# Patient Record
Sex: MALE | Race: WHITE | NOT HISPANIC OR LATINO | Employment: UNEMPLOYED | URBAN - NONMETROPOLITAN AREA
[De-identification: names, ages, dates, MRNs, and addresses within clinical notes are randomized per-mention and may not be internally consistent; named-entity substitution may affect disease eponyms.]

---

## 2017-04-26 ENCOUNTER — HOSPITAL ENCOUNTER (EMERGENCY)
Facility: HOSPITAL | Age: 37
Discharge: STILL ADMITTED OR ADMINISTRATIVE | End: 2017-04-26
Attending: EMERGENCY MEDICINE

## 2017-04-26 ENCOUNTER — HOSPITAL ENCOUNTER (INPATIENT)
Facility: HOSPITAL | Age: 37
LOS: 5 days | Discharge: HOME | End: 2017-05-01

## 2017-04-26 VITALS
WEIGHT: 125 LBS | BODY MASS INDEX: 17.9 KG/M2 | DIASTOLIC BLOOD PRESSURE: 79 MMHG | TEMPERATURE: 98.1 F | RESPIRATION RATE: 16 BRPM | OXYGEN SATURATION: 100 % | SYSTOLIC BLOOD PRESSURE: 123 MMHG | HEART RATE: 76 BPM | HEIGHT: 70 IN

## 2017-04-26 DIAGNOSIS — F19.10 SUBSTANCE ABUSE (HCC): Primary | ICD-10-CM

## 2017-04-26 PROBLEM — R45.851 SUICIDAL IDEATION: Status: ACTIVE | Noted: 2017-04-26

## 2017-04-26 LAB
6-ACETYL MORPHINE: NEGATIVE
ALBUMIN SERPL-MCNC: 4.5 G/DL (ref 3.5–5)
ALBUMIN/GLOB SERPL: 1.1 G/DL (ref 1.5–2.5)
ALP SERPL-CCNC: 101 U/L (ref 40–129)
ALT SERPL W P-5'-P-CCNC: 12 U/L (ref 10–44)
AMPHET+METHAMPHET UR QL: POSITIVE
ANION GAP SERPL CALCULATED.3IONS-SCNC: 6.2 MMOL/L (ref 3.6–11.2)
AST SERPL-CCNC: 14 U/L (ref 10–34)
BACTERIA UR QL AUTO: ABNORMAL /HPF
BARBITURATES UR QL SCN: NEGATIVE
BASOPHILS # BLD AUTO: 0.03 10*3/MM3 (ref 0–0.3)
BASOPHILS NFR BLD AUTO: 0.3 % (ref 0–2)
BENZODIAZ UR QL SCN: POSITIVE
BILIRUB SERPL-MCNC: 0.4 MG/DL (ref 0.2–1.8)
BILIRUB UR QL STRIP: NEGATIVE
BUN BLD-MCNC: 15 MG/DL (ref 7–21)
BUN/CREAT SERPL: 14.2 (ref 7–25)
BUPRENORPHINE SERPL-MCNC: POSITIVE NG/ML
CALCIUM SPEC-SCNC: 9.8 MG/DL (ref 7.7–10)
CANNABINOIDS SERPL QL: POSITIVE
CHLORIDE SERPL-SCNC: 104 MMOL/L (ref 99–112)
CLARITY UR: CLEAR
CO2 SERPL-SCNC: 32.8 MMOL/L (ref 24.3–31.9)
COCAINE UR QL: NEGATIVE
COLOR UR: YELLOW
CREAT BLD-MCNC: 1.06 MG/DL (ref 0.43–1.29)
DEPRECATED RDW RBC AUTO: 44.8 FL (ref 37–54)
EOSINOPHIL # BLD AUTO: 0.1 10*3/MM3 (ref 0–0.7)
EOSINOPHIL NFR BLD AUTO: 1.1 % (ref 0–5)
ERYTHROCYTE [DISTWIDTH] IN BLOOD BY AUTOMATED COUNT: 14 % (ref 11.5–14.5)
ETHANOL BLD-MCNC: <10 MG/DL
ETHANOL UR QL: <0.01 %
GFR SERPL CREATININE-BSD FRML MDRD: 80 ML/MIN/1.73
GLOBULIN UR ELPH-MCNC: 4 GM/DL
GLUCOSE BLD-MCNC: 86 MG/DL (ref 70–110)
GLUCOSE UR STRIP-MCNC: NEGATIVE MG/DL
HCT VFR BLD AUTO: 42.1 % (ref 42–52)
HGB BLD-MCNC: 13.2 G/DL (ref 14–18)
HGB UR QL STRIP.AUTO: NEGATIVE
HYALINE CASTS UR QL AUTO: ABNORMAL /LPF
IMM GRANULOCYTES # BLD: 0.01 10*3/MM3 (ref 0–0.03)
IMM GRANULOCYTES NFR BLD: 0.1 % (ref 0–0.5)
KETONES UR QL STRIP: NEGATIVE
LEUKOCYTE ESTERASE UR QL STRIP.AUTO: ABNORMAL
LYMPHOCYTES # BLD AUTO: 2.03 10*3/MM3 (ref 1–3)
LYMPHOCYTES NFR BLD AUTO: 23.2 % (ref 21–51)
MAGNESIUM SERPL-MCNC: 2.3 MG/DL (ref 1.7–2.6)
MCH RBC QN AUTO: 28 PG (ref 27–33)
MCHC RBC AUTO-ENTMCNC: 31.4 G/DL (ref 33–37)
MCV RBC AUTO: 89.2 FL (ref 80–94)
MDMA UR QL SCN: POSITIVE
METHADONE UR QL SCN: NEGATIVE
MONOCYTES # BLD AUTO: 0.91 10*3/MM3 (ref 0.1–0.9)
MONOCYTES NFR BLD AUTO: 10.4 % (ref 0–10)
NEUTROPHILS # BLD AUTO: 5.68 10*3/MM3 (ref 1.4–6.5)
NEUTROPHILS NFR BLD AUTO: 64.9 % (ref 30–70)
NITRITE UR QL STRIP: NEGATIVE
OPIATES UR QL: NEGATIVE
OSMOLALITY SERPL CALC.SUM OF ELEC: 285.1 MOSM/KG (ref 273–305)
OXYCODONE UR QL SCN: NEGATIVE
PCP UR QL SCN: NEGATIVE
PH UR STRIP.AUTO: 6.5 [PH] (ref 5–8)
PLATELET # BLD AUTO: 337 10*3/MM3 (ref 130–400)
PMV BLD AUTO: 8.5 FL (ref 6–10)
POTASSIUM BLD-SCNC: 4.2 MMOL/L (ref 3.5–5.3)
PROT SERPL-MCNC: 8.5 G/DL (ref 6–8)
PROT UR QL STRIP: NEGATIVE
RBC # BLD AUTO: 4.72 10*6/MM3 (ref 4.7–6.1)
RBC # UR: ABNORMAL /HPF
REF LAB TEST METHOD: ABNORMAL
SODIUM BLD-SCNC: 143 MMOL/L (ref 135–153)
SP GR UR STRIP: 1.02 (ref 1–1.03)
SQUAMOUS #/AREA URNS HPF: ABNORMAL /HPF
UROBILINOGEN UR QL STRIP: ABNORMAL
WBC NRBC COR # BLD: 8.76 10*3/MM3 (ref 4.5–12.5)
WBC UR QL AUTO: ABNORMAL /HPF

## 2017-04-26 RX ORDER — BENZTROPINE MESYLATE 1 MG/ML
0.5 INJECTION INTRAMUSCULAR; INTRAVENOUS DAILY PRN
Status: DISCONTINUED | OUTPATIENT
Start: 2017-04-26 | End: 2017-05-01 | Stop reason: HOSPADM

## 2017-04-26 RX ORDER — HYDROXYZINE 50 MG/1
50 TABLET, FILM COATED ORAL EVERY 6 HOURS PRN
Status: DISCONTINUED | OUTPATIENT
Start: 2017-04-26 | End: 2017-05-01 | Stop reason: HOSPADM

## 2017-04-26 RX ORDER — LOPERAMIDE HYDROCHLORIDE 2 MG/1
2 CAPSULE ORAL 4 TIMES DAILY PRN
Status: DISCONTINUED | OUTPATIENT
Start: 2017-04-26 | End: 2017-05-01 | Stop reason: HOSPADM

## 2017-04-26 RX ORDER — NICOTINE 21 MG/24HR
1 PATCH, TRANSDERMAL 24 HOURS TRANSDERMAL DAILY
Status: DISCONTINUED | OUTPATIENT
Start: 2017-04-26 | End: 2017-05-01 | Stop reason: HOSPADM

## 2017-04-26 RX ORDER — TRAZODONE HYDROCHLORIDE 50 MG/1
50 TABLET ORAL NIGHTLY PRN
Status: DISCONTINUED | OUTPATIENT
Start: 2017-04-26 | End: 2017-05-01 | Stop reason: HOSPADM

## 2017-04-26 RX ORDER — ALUMINA, MAGNESIA, AND SIMETHICONE 2400; 2400; 240 MG/30ML; MG/30ML; MG/30ML
15 SUSPENSION ORAL EVERY 6 HOURS PRN
Status: DISCONTINUED | OUTPATIENT
Start: 2017-04-26 | End: 2017-05-01 | Stop reason: HOSPADM

## 2017-04-26 RX ORDER — ECHINACEA PURPUREA EXTRACT 125 MG
2 TABLET ORAL AS NEEDED
Status: DISCONTINUED | OUTPATIENT
Start: 2017-04-26 | End: 2017-05-01 | Stop reason: HOSPADM

## 2017-04-26 RX ORDER — IBUPROFEN 400 MG/1
600 TABLET ORAL EVERY 6 HOURS PRN
Status: DISCONTINUED | OUTPATIENT
Start: 2017-04-26 | End: 2017-05-01 | Stop reason: HOSPADM

## 2017-04-26 RX ORDER — ONDANSETRON 4 MG/1
4 TABLET, FILM COATED ORAL EVERY 6 HOURS PRN
Status: DISCONTINUED | OUTPATIENT
Start: 2017-04-26 | End: 2017-05-01 | Stop reason: HOSPADM

## 2017-04-26 RX ORDER — BENZTROPINE MESYLATE 1 MG/1
1 TABLET ORAL DAILY PRN
Status: DISCONTINUED | OUTPATIENT
Start: 2017-04-26 | End: 2017-05-01 | Stop reason: HOSPADM

## 2017-04-26 RX ORDER — BENZONATATE 100 MG/1
100 CAPSULE ORAL 3 TIMES DAILY PRN
Status: DISCONTINUED | OUTPATIENT
Start: 2017-04-26 | End: 2017-05-01 | Stop reason: HOSPADM

## 2017-04-26 RX ORDER — FAMOTIDINE 20 MG/1
20 TABLET, FILM COATED ORAL 2 TIMES DAILY PRN
Status: DISCONTINUED | OUTPATIENT
Start: 2017-04-26 | End: 2017-05-01 | Stop reason: HOSPADM

## 2017-04-26 RX ADMIN — HYDROXYZINE HYDROCHLORIDE 50 MG: 50 TABLET ORAL at 22:00

## 2017-04-26 RX ADMIN — TRAZODONE HYDROCHLORIDE 50 MG: 50 TABLET ORAL at 21:53

## 2017-04-27 PROBLEM — F19.10 POLYSUBSTANCE ABUSE (HCC): Status: ACTIVE | Noted: 2017-04-27

## 2017-04-27 PROBLEM — F42.9 OCD (OBSESSIVE COMPULSIVE DISORDER): Status: ACTIVE | Noted: 2017-04-27

## 2017-04-27 PROBLEM — F33.9 MAJOR DEPRESSION, RECURRENT (HCC): Status: ACTIVE | Noted: 2017-04-26

## 2017-04-27 LAB — T4 FREE SERPL-MCNC: 1.16 NG/DL (ref 0.89–1.76)

## 2017-04-27 PROCEDURE — 71020 HC CHEST PA AND LATERAL: CPT

## 2017-04-27 PROCEDURE — 84439 ASSAY OF FREE THYROXINE: CPT | Performed by: PSYCHIATRY & NEUROLOGY

## 2017-04-27 PROCEDURE — 99223 1ST HOSP IP/OBS HIGH 75: CPT | Performed by: PSYCHIATRY & NEUROLOGY

## 2017-04-27 RX ORDER — QUETIAPINE FUMARATE 25 MG/1
50 TABLET, FILM COATED ORAL NIGHTLY
Status: DISCONTINUED | OUTPATIENT
Start: 2017-04-27 | End: 2017-04-28

## 2017-04-27 RX ADMIN — NICOTINE 1 PATCH: 21 PATCH TRANSDERMAL at 10:45

## 2017-04-27 RX ADMIN — SERTRALINE HYDROCHLORIDE 50 MG: 50 TABLET, FILM COATED ORAL at 12:31

## 2017-04-27 RX ADMIN — TRAZODONE HYDROCHLORIDE 50 MG: 50 TABLET ORAL at 20:26

## 2017-04-27 RX ADMIN — QUETIAPINE FUMARATE 50 MG: 25 TABLET, FILM COATED ORAL at 20:26

## 2017-04-28 LAB — TSH SERPL DL<=0.05 MIU/L-ACNC: 0.32 MIU/ML (ref 0.55–4.78)

## 2017-04-28 PROCEDURE — 99232 SBSQ HOSP IP/OBS MODERATE 35: CPT | Performed by: PSYCHIATRY & NEUROLOGY

## 2017-04-28 PROCEDURE — 84443 ASSAY THYROID STIM HORMONE: CPT | Performed by: PSYCHIATRY & NEUROLOGY

## 2017-04-28 RX ORDER — QUETIAPINE FUMARATE 100 MG/1
100 TABLET, FILM COATED ORAL NIGHTLY
Status: DISCONTINUED | OUTPATIENT
Start: 2017-04-28 | End: 2017-05-01 | Stop reason: HOSPADM

## 2017-04-28 RX ORDER — FLUOXETINE HYDROCHLORIDE 20 MG/1
20 CAPSULE ORAL DAILY
Status: DISCONTINUED | OUTPATIENT
Start: 2017-04-28 | End: 2017-05-01 | Stop reason: HOSPADM

## 2017-04-28 RX ADMIN — FLUOXETINE 20 MG: 20 CAPSULE ORAL at 10:05

## 2017-04-28 RX ADMIN — TRAZODONE HYDROCHLORIDE 50 MG: 50 TABLET ORAL at 20:28

## 2017-04-28 RX ADMIN — QUETIAPINE FUMARATE 100 MG: 100 TABLET ORAL at 20:28

## 2017-04-28 RX ADMIN — NICOTINE 1 PATCH: 21 PATCH TRANSDERMAL at 08:21

## 2017-04-28 RX ADMIN — HYDROXYZINE HYDROCHLORIDE 50 MG: 50 TABLET ORAL at 18:12

## 2017-04-29 PROBLEM — F33.2 MAJOR DEPRESSIVE DISORDER, RECURRENT, SEVERE WITHOUT PSYCHOTIC FEATURES (HCC): Status: ACTIVE | Noted: 2017-04-26

## 2017-04-29 LAB — BACTERIA SPEC AEROBE CULT: NORMAL

## 2017-04-29 PROCEDURE — 99232 SBSQ HOSP IP/OBS MODERATE 35: CPT

## 2017-04-29 RX ADMIN — NICOTINE 1 PATCH: 21 PATCH TRANSDERMAL at 08:34

## 2017-04-29 RX ADMIN — TRAZODONE HYDROCHLORIDE 50 MG: 50 TABLET ORAL at 22:13

## 2017-04-29 RX ADMIN — FLUOXETINE 20 MG: 20 CAPSULE ORAL at 08:34

## 2017-04-29 RX ADMIN — QUETIAPINE FUMARATE 100 MG: 100 TABLET ORAL at 20:50

## 2017-04-30 PROCEDURE — 99231 SBSQ HOSP IP/OBS SF/LOW 25: CPT

## 2017-04-30 RX ADMIN — QUETIAPINE FUMARATE 100 MG: 100 TABLET ORAL at 20:21

## 2017-04-30 RX ADMIN — NICOTINE 1 PATCH: 21 PATCH TRANSDERMAL at 08:27

## 2017-04-30 RX ADMIN — FLUOXETINE 20 MG: 20 CAPSULE ORAL at 08:26

## 2017-05-01 VITALS
HEART RATE: 73 BPM | RESPIRATION RATE: 16 BRPM | TEMPERATURE: 97.1 F | HEIGHT: 67 IN | SYSTOLIC BLOOD PRESSURE: 123 MMHG | DIASTOLIC BLOOD PRESSURE: 79 MMHG | BODY MASS INDEX: 16.43 KG/M2 | WEIGHT: 104.7 LBS | OXYGEN SATURATION: 99 %

## 2017-05-01 PROCEDURE — 99238 HOSP IP/OBS DSCHRG MGMT 30/<: CPT | Performed by: PSYCHIATRY & NEUROLOGY

## 2017-05-01 RX ORDER — FLUOXETINE HYDROCHLORIDE 20 MG/1
20 CAPSULE ORAL DAILY
Qty: 30 CAPSULE | Refills: 0 | Status: SHIPPED | OUTPATIENT
Start: 2017-05-01 | End: 2017-07-19

## 2017-05-01 RX ORDER — QUETIAPINE FUMARATE 100 MG/1
100 TABLET, FILM COATED ORAL NIGHTLY
Qty: 30 TABLET | Refills: 0 | Status: SHIPPED | OUTPATIENT
Start: 2017-05-01

## 2017-05-01 RX ADMIN — NICOTINE 1 PATCH: 21 PATCH TRANSDERMAL at 08:00

## 2017-05-01 RX ADMIN — FLUOXETINE 20 MG: 20 CAPSULE ORAL at 08:00

## 2017-07-13 ENCOUNTER — HOSPITAL ENCOUNTER (EMERGENCY)
Facility: HOSPITAL | Age: 37
Discharge: HOME | End: 2017-07-13
Attending: EMERGENCY MEDICINE

## 2017-07-13 VITALS
TEMPERATURE: 98.3 F | HEART RATE: 79 BPM | HEIGHT: 70 IN | RESPIRATION RATE: 20 BRPM | WEIGHT: 135 LBS | OXYGEN SATURATION: 99 % | SYSTOLIC BLOOD PRESSURE: 140 MMHG | BODY MASS INDEX: 19.33 KG/M2 | DIASTOLIC BLOOD PRESSURE: 90 MMHG

## 2017-07-13 DIAGNOSIS — F41.9 ANXIETY: Primary | ICD-10-CM

## 2017-07-13 NOTE — NURSING NOTE
Intake assessment completed. Patient denies any current SI or HI. Patient reports that he is currently residing in a recovery place and has been away from his family for about 4 months and that he missed his chance to visit with them earlier today and became very upset and had an anxiety attack. He says that due to his hx of depression the place he was staying at wanted him to be checked out real good and he told them about the [facility] so they brought him here. He also reports that this was also a way for him to get to see his family who met him here at the hospital. He denies having any thoughts of self harm at all and says that he never told anyone this. He says that they just knew about his history and felt he should be evaluated. Anxiety and depression both rated a 0 out of 10 at this time. He reports that he is over 100 days sober now and wants to stay clean. Emotional support provided to patient and pt praised for working on his goals of remaining sober.

## 2017-07-14 ENCOUNTER — HOSPITAL ENCOUNTER (INPATIENT)
Facility: HOSPITAL | Age: 37
LOS: 5 days | Discharge: HOME | End: 2017-07-19
Attending: PSYCHIATRY & NEUROLOGY | Admitting: PSYCHIATRY & NEUROLOGY

## 2017-07-14 ENCOUNTER — HOSPITAL ENCOUNTER (EMERGENCY)
Facility: HOSPITAL | Age: 37
Discharge: STILL ADMITTED OR ADMINISTRATIVE | End: 2017-07-14
Attending: EMERGENCY MEDICINE

## 2017-07-14 VITALS
HEART RATE: 94 BPM | HEIGHT: 70 IN | OXYGEN SATURATION: 99 % | BODY MASS INDEX: 19.33 KG/M2 | WEIGHT: 135 LBS | TEMPERATURE: 98.3 F | SYSTOLIC BLOOD PRESSURE: 143 MMHG | RESPIRATION RATE: 18 BRPM | DIASTOLIC BLOOD PRESSURE: 101 MMHG

## 2017-07-14 DIAGNOSIS — R45.851 SUICIDAL IDEATION: ICD-10-CM

## 2017-07-14 DIAGNOSIS — F19.10 POLYSUBSTANCE ABUSE (HCC): Primary | ICD-10-CM

## 2017-07-14 DIAGNOSIS — F33.2 MAJOR DEPRESSIVE DISORDER, RECURRENT, SEVERE WITHOUT PSYCHOTIC FEATURES (HCC): ICD-10-CM

## 2017-07-14 PROBLEM — F32.9 MDD (MAJOR DEPRESSIVE DISORDER), SINGLE EPISODE: Status: ACTIVE | Noted: 2017-07-14

## 2017-07-14 LAB
6-ACETYL MORPHINE: NEGATIVE
ALBUMIN SERPL-MCNC: 4.9 G/DL (ref 3.5–5)
ALBUMIN/GLOB SERPL: 1.4 G/DL (ref 1.5–2.5)
ALP SERPL-CCNC: 80 U/L (ref 40–129)
ALT SERPL W P-5'-P-CCNC: 54 U/L (ref 10–44)
AMPHET+METHAMPHET UR QL: NEGATIVE
ANION GAP SERPL CALCULATED.3IONS-SCNC: 5.4 MMOL/L (ref 3.6–11.2)
AST SERPL-CCNC: 39 U/L (ref 10–34)
BARBITURATES UR QL SCN: NEGATIVE
BASOPHILS # BLD AUTO: 0.05 10*3/MM3 (ref 0–0.3)
BASOPHILS NFR BLD AUTO: 0.5 % (ref 0–2)
BENZODIAZ UR QL SCN: NEGATIVE
BILIRUB SERPL-MCNC: 0.2 MG/DL (ref 0.2–1.8)
BILIRUB UR QL STRIP: NEGATIVE
BUN BLD-MCNC: 15 MG/DL (ref 7–21)
BUN/CREAT SERPL: 11.7 (ref 7–25)
BUPRENORPHINE SERPL-MCNC: NEGATIVE NG/ML
CALCIUM SPEC-SCNC: 10.1 MG/DL (ref 7.7–10)
CANNABINOIDS SERPL QL: NEGATIVE
CHLORIDE SERPL-SCNC: 112 MMOL/L (ref 99–112)
CLARITY UR: CLEAR
CO2 SERPL-SCNC: 28.6 MMOL/L (ref 24.3–31.9)
COCAINE UR QL: NEGATIVE
COLOR UR: YELLOW
CREAT BLD-MCNC: 1.28 MG/DL (ref 0.43–1.29)
DEPRECATED RDW RBC AUTO: 53 FL (ref 37–54)
EOSINOPHIL # BLD AUTO: 0.07 10*3/MM3 (ref 0–0.7)
EOSINOPHIL NFR BLD AUTO: 0.7 % (ref 0–5)
ERYTHROCYTE [DISTWIDTH] IN BLOOD BY AUTOMATED COUNT: 16.5 % (ref 11.5–14.5)
ETHANOL BLD-MCNC: <10 MG/DL
ETHANOL UR QL: <0.01 %
GFR SERPL CREATININE-BSD FRML MDRD: 64 ML/MIN/1.73
GLOBULIN UR ELPH-MCNC: 3.5 GM/DL
GLUCOSE BLD-MCNC: 95 MG/DL (ref 70–110)
GLUCOSE UR STRIP-MCNC: NEGATIVE MG/DL
HCT VFR BLD AUTO: 46.4 % (ref 42–52)
HGB BLD-MCNC: 14.8 G/DL (ref 14–18)
HGB UR QL STRIP.AUTO: NEGATIVE
IMM GRANULOCYTES # BLD: 0.02 10*3/MM3 (ref 0–0.03)
IMM GRANULOCYTES NFR BLD: 0.2 % (ref 0–0.5)
KETONES UR QL STRIP: ABNORMAL
LEUKOCYTE ESTERASE UR QL STRIP.AUTO: NEGATIVE
LYMPHOCYTES # BLD AUTO: 2.14 10*3/MM3 (ref 1–3)
LYMPHOCYTES NFR BLD AUTO: 20.5 % (ref 21–51)
MCH RBC QN AUTO: 29.4 PG (ref 27–33)
MCHC RBC AUTO-ENTMCNC: 31.9 G/DL (ref 33–37)
MCV RBC AUTO: 92.1 FL (ref 80–94)
METHADONE UR QL SCN: NEGATIVE
MONOCYTES # BLD AUTO: 0.89 10*3/MM3 (ref 0.1–0.9)
MONOCYTES NFR BLD AUTO: 8.5 % (ref 0–10)
NEUTROPHILS # BLD AUTO: 7.29 10*3/MM3 (ref 1.4–6.5)
NEUTROPHILS NFR BLD AUTO: 69.6 % (ref 30–70)
NITRITE UR QL STRIP: NEGATIVE
OPIATES UR QL: NEGATIVE
OSMOLALITY SERPL CALC.SUM OF ELEC: 291.2 MOSM/KG (ref 273–305)
OXYCODONE UR QL SCN: NEGATIVE
PCP UR QL SCN: NEGATIVE
PH UR STRIP.AUTO: 6.5 [PH] (ref 5–8)
PLATELET # BLD AUTO: 260 10*3/MM3 (ref 130–400)
PMV BLD AUTO: 9.8 FL (ref 6–10)
POTASSIUM BLD-SCNC: 4.1 MMOL/L (ref 3.5–5.3)
PROT SERPL-MCNC: 8.4 G/DL (ref 6–8)
PROT UR QL STRIP: ABNORMAL
RBC # BLD AUTO: 5.04 10*6/MM3 (ref 4.7–6.1)
SODIUM BLD-SCNC: 146 MMOL/L (ref 135–153)
SP GR UR STRIP: 1.03 (ref 1–1.03)
UROBILINOGEN UR QL STRIP: ABNORMAL
WBC NRBC COR # BLD: 10.46 10*3/MM3 (ref 4.5–12.5)

## 2017-07-14 RX ORDER — MULTIVITAMIN
1 TABLET ORAL DAILY
COMMUNITY

## 2017-07-14 RX ORDER — HYDROXYZINE 50 MG/1
50 TABLET, FILM COATED ORAL EVERY 6 HOURS PRN
Status: DISCONTINUED | OUTPATIENT
Start: 2017-07-14 | End: 2017-07-19 | Stop reason: HOSPADM

## 2017-07-14 RX ORDER — FAMOTIDINE 20 MG/1
20 TABLET, FILM COATED ORAL 2 TIMES DAILY PRN
Status: DISCONTINUED | OUTPATIENT
Start: 2017-07-14 | End: 2017-07-19 | Stop reason: HOSPADM

## 2017-07-14 RX ORDER — FLUOXETINE HYDROCHLORIDE 20 MG/1
20 CAPSULE ORAL DAILY
Status: DISCONTINUED | OUTPATIENT
Start: 2017-07-15 | End: 2017-07-15

## 2017-07-14 RX ORDER — TRAZODONE HYDROCHLORIDE 50 MG/1
50 TABLET ORAL NIGHTLY PRN
Status: DISCONTINUED | OUTPATIENT
Start: 2017-07-14 | End: 2017-07-19

## 2017-07-14 RX ORDER — BENZONATATE 100 MG/1
100 CAPSULE ORAL 3 TIMES DAILY PRN
Status: DISCONTINUED | OUTPATIENT
Start: 2017-07-14 | End: 2017-07-19 | Stop reason: HOSPADM

## 2017-07-14 RX ORDER — ALUMINA, MAGNESIA, AND SIMETHICONE 2400; 2400; 240 MG/30ML; MG/30ML; MG/30ML
15 SUSPENSION ORAL EVERY 6 HOURS PRN
Status: DISCONTINUED | OUTPATIENT
Start: 2017-07-14 | End: 2017-07-19 | Stop reason: HOSPADM

## 2017-07-14 RX ORDER — QUETIAPINE FUMARATE 100 MG/1
100 TABLET, FILM COATED ORAL NIGHTLY
Status: DISCONTINUED | OUTPATIENT
Start: 2017-07-14 | End: 2017-07-18

## 2017-07-14 RX ORDER — MULTIVITAMIN
1 TABLET ORAL DAILY
Status: DISCONTINUED | OUTPATIENT
Start: 2017-07-15 | End: 2017-07-19 | Stop reason: HOSPADM

## 2017-07-14 RX ORDER — CHOLECALCIFEROL (VITAMIN D3) 125 MCG
500 CAPSULE ORAL DAILY
COMMUNITY

## 2017-07-14 RX ORDER — ECHINACEA PURPUREA EXTRACT 125 MG
2 TABLET ORAL AS NEEDED
Status: DISCONTINUED | OUTPATIENT
Start: 2017-07-14 | End: 2017-07-19 | Stop reason: HOSPADM

## 2017-07-14 RX ORDER — LANOLIN ALCOHOL/MO/W.PET/CERES
500 CREAM (GRAM) TOPICAL DAILY
Status: DISCONTINUED | OUTPATIENT
Start: 2017-07-15 | End: 2017-07-19 | Stop reason: HOSPADM

## 2017-07-14 RX ORDER — LOPERAMIDE HYDROCHLORIDE 2 MG/1
2 CAPSULE ORAL 4 TIMES DAILY PRN
Status: DISCONTINUED | OUTPATIENT
Start: 2017-07-14 | End: 2017-07-19 | Stop reason: HOSPADM

## 2017-07-14 RX ORDER — IBUPROFEN 400 MG/1
600 TABLET ORAL EVERY 6 HOURS PRN
Status: DISCONTINUED | OUTPATIENT
Start: 2017-07-14 | End: 2017-07-19 | Stop reason: HOSPADM

## 2017-07-14 RX ORDER — ONDANSETRON 4 MG/1
4 TABLET, FILM COATED ORAL EVERY 6 HOURS PRN
Status: DISCONTINUED | OUTPATIENT
Start: 2017-07-14 | End: 2017-07-19 | Stop reason: HOSPADM

## 2017-07-14 RX ORDER — NICOTINE 21 MG/24HR
1 PATCH, TRANSDERMAL 24 HOURS TRANSDERMAL
Status: DISCONTINUED | OUTPATIENT
Start: 2017-07-15 | End: 2017-07-17

## 2017-07-14 RX ADMIN — QUETIAPINE FUMARATE 100 MG: 100 TABLET ORAL at 22:34

## 2017-07-14 NOTE — NURSING NOTE
Attempted to contact  via phone x3. Called and spoke to Dr. Gold. Intake information provided. Instructed to dc pt and provide outpt referral.[id number].  Notified pt and ed provider of plan of care.

## 2017-07-14 NOTE — NURSING NOTE
Intake assessment completed. Patient reports that today he had family court and lost custody of his oldest son to his x wife. Patient was also informed by the place where he was staying at for rehab that they were not letting him come back and told him it was due to his anxiety problems and that they felt he was no longer a fit for their facility. Patient reports that he is so overwhelmed today that he is depressed, feels worthless, helpless and earlier started thinking that he wished he had done it right the first time. When asked what he meant, the patient reports that he thought about just overdosing and being done with it. Anxiety and depression rated a 10/10. Patient says that yesterday he felt upbeat and got to see his family and then today his world just fell apart and feels useless. Emotional support provided to pt. Patient denies any current drug use but says that he is afraid he is going to start using again and says he is scared to death. Emotional support provided to pt.

## 2017-07-14 NOTE — NURSING NOTE
Patient to intake per ED staff.  Pockets emptied and through search complete.  Patient searched by intake nurse and witnessed (Security Nickolas) per ED Policy 100.  Belongings logged on Personal Belongings Inventory Sheet.  Patient placed in hospital attire.  Room swept for any potential safety hazards, room cleared, and patient placed in treatment room.  Patient ready for evaluation.

## 2017-07-14 NOTE — ED PROVIDER NOTES
Subjective   Patient is a 36 y.o. male presenting with mental health disorder.   History provided by:  Patient   used: No    Mental Health Problem   Presenting symptoms comment:  Patient presents to the ED after anxiety attack.  He denies SI/HI,AVH.  Patient is currently residing at rehab facility in [place], [place] ([facility]).  Patient stated that they just recommended he have a psych eval.  Timing:  Constant  Progression:  Worsening  Chronicity:  New  Relieved by:  Nothing  Worsened by:  Nothing  Ineffective treatments:  None tried  Associated symptoms: anxiety    Associated symptoms: no abdominal pain, no anhedonia, no appetite change, no chest pain, no decreased need for sleep, not distractible, no euphoric mood, no fatigue, no feelings of worthlessness, no headaches, no hypersomnia, no hyperventilation, no insomnia, no irritability, no poor judgment, no school problems and no weight change    Risk factors: hx of mental illness        Review of Systems   Constitutional: Negative.  Negative for appetite change, fatigue and irritability.   HENT: Negative.    Eyes: Negative.    Respiratory: Negative.    Cardiovascular: Negative.  Negative for chest pain.   Gastrointestinal: Negative.  Negative for abdominal pain.   Endocrine: Negative.    Genitourinary: Negative.    Musculoskeletal: Negative.    Skin: Negative.    Allergic/Immunologic: Negative.    Neurological: Negative.  Negative for headaches.   Hematological: Negative.    Psychiatric/Behavioral: The patient is nervous/anxious. The patient does not have insomnia.    All other systems reviewed and are negative.      Past Medical History:   Diagnosis Date   • ADHD (attention deficit hyperactivity disorder)    • Anxiety    • Depression    • Kidney stones    • Psychiatric illness    • Substance abuse    • Suicidal thoughts    • Suicide attempt     overdose attempt 4/20/17 and 4/22/17       No Known Allergies    Past Surgical History:   Procedure  "Laterality Date   • NO PAST SURGERIES         Family History   Problem Relation Age of Onset   • Anxiety disorder Mother    • Depression Father    • Anxiety disorder Father    • Drug abuse Cousin        Social History     Social History   • Marital status:      Spouse name: N/A   • Number of children: N/A   • Years of education: N/A     Social History Main Topics   • Smoking status: Current Every Day Smoker     Packs/day: 0.50     Years: 13.00     Types: Cigarettes   • Smokeless tobacco: Never Used   • Alcohol use No   • Drug use: Yes     Special: Marijuana, Amphetamines      Comment: States \"Up until last week, the extent of my drug use was marijuana. I couldn't get anybody to give me anything to help with my anxiety. Then I took everything I could get my hands on so I wouldn't wake up.\"   • Sexual activity: Yes     Partners: Female     Other Topics Concern   • None     Social History Narrative           Objective   Physical Exam   Constitutional: He is oriented to person, place, and time. He appears well-developed and well-nourished.   HENT:   Head: Normocephalic and atraumatic.   Right Ear: External ear normal.   Left Ear: External ear normal.   Nose: Nose normal.   Mouth/Throat: Oropharynx is clear and moist.   Eyes: EOM are normal. Pupils are equal, round, and reactive to light.   Neck: Normal range of motion. Neck supple.   Cardiovascular: Normal rate, regular rhythm, normal heart sounds and intact distal pulses.    Pulmonary/Chest: Effort normal and breath sounds normal.   Abdominal: Soft. Bowel sounds are normal.   Musculoskeletal: Normal range of motion.   Neurological: He is alert and oriented to person, place, and time.   Skin: Skin is warm and dry.   Psychiatric: He has a normal mood and affect. His speech is normal and behavior is normal. Judgment and thought content normal. He is not actively hallucinating. Cognition and memory are normal. He is attentive.   Nursing note and vitals " reviewed.      Procedures         ED Course  ED Course   Comment By Time   Dr. Wade recommends outpatient f/u with psychiatrist and to return to his rehab facility for continued treatment. Patient does not meet criteria for inpatient hospitalization. LEAH Boothe 07/14 0600                  [facility]    Final diagnoses:   Anxiety            LEAH Boothe  07/14/17 0600

## 2017-07-14 NOTE — ED PROVIDER NOTES
Subjective   Patient is a 36 y.o. male presenting with mental health disorder.   History provided by:  Patient   used: No    Mental Health Problem   Presenting symptoms: aggressive behavior, depression, suicidal thoughts and suicidal threats    Patient accompanied by:  Caregiver  Degree of incapacity (severity):  Moderate  Onset quality:  Sudden  Duration:  1 day  Timing:  Constant  Progression:  Worsening  Chronicity:  New  Context: not alcohol use, not drug abuse, not noncompliant and not recent medication change    Treatment compliance:  Untreated  Time since last psychoactive medication taken:  1 day  Relieved by:  Nothing  Worsened by:  Nothing  Associated symptoms: anxiety    Associated symptoms: no abdominal pain, no anhedonia, no fatigue, no hypersomnia, no hyperventilation and no insomnia        Review of Systems   Constitutional: Negative for fatigue.   Gastrointestinal: Negative for abdominal pain.   Psychiatric/Behavioral: Positive for suicidal ideas. The patient is nervous/anxious. The patient does not have insomnia.    All other systems reviewed and are negative.      Past Medical History:   Diagnosis Date   • ADHD (attention deficit hyperactivity disorder)    • Anxiety    • Depression    • Kidney stones    • Psychiatric illness    • Substance abuse    • Suicidal thoughts    • Suicide attempt     overdose attempt 4/20/17 and 4/22/17       No Known Allergies    Past Surgical History:   Procedure Laterality Date   • NO PAST SURGERIES         Family History   Problem Relation Age of Onset   • Anxiety disorder Mother    • Depression Father    • Anxiety disorder Father    • Drug abuse Cousin        Social History     Social History   • Marital status:      Spouse name: N/A   • Number of children: N/A   • Years of education: N/A     Social History Main Topics   • Smoking status: Current Every Day Smoker     Packs/day: 0.50     Years: 13.00     Types: Cigarettes   • Smokeless  "tobacco: Never Used   • Alcohol use No   • Drug use: Yes     Special: Marijuana, Amphetamines      Comment: States \"Up until last week, the extent of my drug use was marijuana. I couldn't get anybody to give me anything to help with my anxiety. Then I took everything I could get my hands on so I wouldn't wake up.\"   • Sexual activity: Yes     Partners: Female     Other Topics Concern   • Not on file     Social History Narrative           Objective   Physical Exam   Constitutional: He is oriented to person, place, and time. He appears well-developed and well-nourished.   HENT:   Head: Normocephalic and atraumatic.   Right Ear: External ear normal.   Left Ear: External ear normal.   Nose: Nose normal.   Mouth/Throat: Oropharynx is clear and moist.   Eyes: Conjunctivae and EOM are normal. Pupils are equal, round, and reactive to light.   Neck: Normal range of motion. Neck supple.   Cardiovascular: Normal rate, regular rhythm and normal heart sounds.    Pulmonary/Chest: Effort normal and breath sounds normal.   Abdominal: Soft. Normal appearance and bowel sounds are normal. He exhibits no mass. There is no tenderness. There is no guarding.   Musculoskeletal: Normal range of motion.   Neurological: He is alert and oriented to person, place, and time.   Skin: Skin is warm and dry.   Psychiatric: Judgment and thought content normal. His mood appears anxious. He is agitated. He exhibits a depressed mood.   Nursing note and vitals reviewed.      Procedures         ED Course  ED Course   Comment By Time   36-year-old male comes in complaining of suicidal ideation past day.  Patient does state that he has specific plan with taking a large amount of pills and overdose.  Patient does have history of suicidal thoughts. Bashir Cox PA-C 07/14 1759   Endorsed to Marlon Cha. Bashir Cox PA-C 07/14 1953                  [facility]    Final diagnoses:   Polysubstance abuse   Major depressive disorder, recurrent, severe " without psychotic features   Suicidal ideation            Bashir Cox PA-C  07/14/17 1956

## 2017-07-15 PROCEDURE — 99223 1ST HOSP IP/OBS HIGH 75: CPT | Performed by: PSYCHIATRY & NEUROLOGY

## 2017-07-15 RX ORDER — FLUOXETINE HYDROCHLORIDE 20 MG/1
40 CAPSULE ORAL DAILY
Status: DISCONTINUED | OUTPATIENT
Start: 2017-07-16 | End: 2017-07-17

## 2017-07-15 RX ADMIN — QUETIAPINE FUMARATE 100 MG: 100 TABLET ORAL at 20:22

## 2017-07-15 RX ADMIN — Medication 1 TABLET: at 09:02

## 2017-07-15 RX ADMIN — FLUOXETINE 20 MG: 20 CAPSULE ORAL at 09:02

## 2017-07-15 RX ADMIN — CYANOCOBALAMIN TAB 1000 MCG 500 MCG: 1000 TAB at 09:02

## 2017-07-15 NOTE — PLAN OF CARE
Problem: BH Patient Care Overview (Adult)  Goal: Plan of Care Review  Outcome: Ongoing (interventions implemented as appropriate)  Admitted from ER with complaints of anxiety, depression and S.I. Had just been in rehab x100 days but  was having panic attacks and was brought to ER. Just found out he lost custody of his 10-year-old child and says he was fearful of relapsing and thus came to ER. UDS was negative.    07/15/17 0556   Coping/Psychosocial Response Interventions   Plan Of Care Reviewed With patient   Coping/Psychosocial   Patient Agreement with Plan of Care agrees   Patient Care Overview   Progress no change        Problem:  Overarching Goals  Goal: Adheres to Safety Considerations for Self and Others  Outcome: Ongoing (interventions implemented as appropriate)  Goal: Optimized Coping Skills in Response to Life Stressors  Outcome: Ongoing (interventions implemented as appropriate)  Goal: Develops/Participates in Therapeutic [place] to Support Successful Transition  Outcome: Ongoing (interventions implemented as appropriate)

## 2017-07-15 NOTE — PLAN OF CARE
Problem:  Patient Care Overview (Adult)  Goal: Individualization and Mutuality  Outcome: Ongoing (interventions implemented as appropriate)    07/14/17 2150 07/15/17 1358   Behavioral Health Screens   Patient Personal Strengths --  family/social support;ability to maintain sobriety;motivated for recovery;motivated for treatment;spiritual/Scientology support;expressive of emotions;expressive of needs   Patient Personal Strengths Comment --  willing to seek help    Patient Vulnerabilities --  ineffective coping    Individualization   Patient Specific Preferences --  patient to list 2 positive coping skills to manage stressors    Patient Specific Goals --  mood stabilization    Patient Specific Interventions --  Individual and group therapy to focus on healthy coping skills to schedule follow up care    Mutuality/Individual Preferences   What Anxieties, Fears or Concerns Do You Have About Your Health or Care? --  none   What Questions Do You Have About Your Health or Care? --  none    What Information Would Help Us Give You More Personalized Care? able to sleep with seroquel --        Goal: Discharge Needs Assessment  Outcome: Ongoing (interventions implemented as appropriate)    07/14/17 2147 07/15/17 1358   Discharge Needs Assessment   Concerns To Be Addressed --  mental health concerns;suicidal concerns   Readmission Within The Last 30 Days --  no previous admission in last 30 days   Community Agency Name(S) --  To be determined    Current Discharge Risk --  psychiatric illness   Discharge Planning Comments --  Patient is able to obtain his medications   Discharge Needs Assessment   Outpatient/Agency/Support Group Needs --  outpatient medication management;outpatient psychiatric care (specify);outpatient substance abuse treatment (specify)   Anticipated Discharge Disposition --  home with family   Discharge Disposition --  home with family   Living Environment   Transportation Available family or friend will provide  "--       Met with patient for individual introduced self as assigned  therapist he was agreeable Completed PSA treatment plan and integrated summary. Discussed treatment objectives and briefly discussed disposition plans. He is considering his follow up care, he is court ordered to complete a 6 month program or to complete a program as they deem he has successfully completed. He is considering his follow up care plans.      The patient has been sober for 100 days and has been staying at [facility][1] for the past 28 days. Yesterday pt had a panic attack and requested evaluation at [facility] pt was able to see his family here when he returned to the program they refused to take him back. They told him they were not able to \"handle his mental health issues\". That he needed a higher level of care. Pt found out he lost all rights to see his 10 yr old son in court 2 days ago, he had a panic attack and was having suicidal thoughts since that time. Patient had 2 attempted overdoses in April 2017. Patient rated anxiety at 6/10 and depression at 8/10.     Treatment team to stabilize patients symptoms, provide 24 hr nursing supervision and provide individual and group therapy to focus on healthy coping and schedule follow up care. Patient has a supportive family. He has court ordered treatment.               "

## 2017-07-15 NOTE — PLAN OF CARE
Problem:  Patient Care Overview (Adult)  Goal: Plan of Care Review  Outcome: Ongoing (interventions implemented as appropriate)    07/15/17 1619   Coping/Psychosocial Response Interventions   Plan Of Care Reviewed With patient   Coping/Psychosocial   Patient Agreement with Plan of Care agrees   Patient Care Overview   Progress no change   Outcome Evaluation   Outcome Summary/Follow up Plan Mostly in his room today. Continues to report suicidal thoughts.         Problem:  Overarching Goals  Goal: Adheres to Safety Considerations for Self and Others  Outcome: Ongoing (interventions implemented as appropriate)  Goal: Optimized Coping Skills in Response to Life Stressors  Outcome: Ongoing (interventions implemented as appropriate)  Goal: Develops/Participates in Therapeutic [place] to Support Successful Transition  Outcome: Ongoing (interventions implemented as appropriate)
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
## 2017-07-16 LAB
ALBUMIN SERPL-MCNC: 4.5 G/DL (ref 3.5–5)
ALBUMIN/GLOB SERPL: 1.5 G/DL (ref 1.5–2.5)
ALP SERPL-CCNC: 76 U/L (ref 40–129)
ALT SERPL W P-5'-P-CCNC: 41 U/L (ref 10–44)
ANION GAP SERPL CALCULATED.3IONS-SCNC: 8 MMOL/L (ref 3.6–11.2)
AST SERPL-CCNC: 28 U/L (ref 10–34)
BILIRUB SERPL-MCNC: 0.3 MG/DL (ref 0.2–1.8)
BUN BLD-MCNC: 26 MG/DL (ref 7–21)
BUN/CREAT SERPL: 23.6 (ref 7–25)
CALCIUM SPEC-SCNC: 9.9 MG/DL (ref 7.7–10)
CHLORIDE SERPL-SCNC: 108 MMOL/L (ref 99–112)
CHOLEST SERPL-MCNC: 292 MG/DL (ref 0–200)
CO2 SERPL-SCNC: 26 MMOL/L (ref 24.3–31.9)
CREAT BLD-MCNC: 1.1 MG/DL (ref 0.43–1.29)
GFR SERPL CREATININE-BSD FRML MDRD: 77 ML/MIN/1.73
GLOBULIN UR ELPH-MCNC: 3.1 GM/DL
GLUCOSE BLD-MCNC: 86 MG/DL (ref 70–110)
HDLC SERPL-MCNC: 63 MG/DL (ref 60–100)
LDLC SERPL CALC-MCNC: 205 MG/DL (ref 0–100)
LDLC/HDLC SERPL: 3.26 {RATIO}
OSMOLALITY SERPL CALC.SUM OF ELEC: 287.2 MOSM/KG (ref 273–305)
POTASSIUM BLD-SCNC: 4.5 MMOL/L (ref 3.5–5.3)
PROT SERPL-MCNC: 7.6 G/DL (ref 6–8)
SODIUM BLD-SCNC: 142 MMOL/L (ref 135–153)
TRIGL SERPL-MCNC: 118 MG/DL (ref 0–150)
TSH SERPL DL<=0.05 MIU/L-ACNC: 1.34 MIU/ML (ref 0.55–4.78)
VLDLC SERPL-MCNC: 23.6 MG/DL

## 2017-07-16 PROCEDURE — 80061 LIPID PANEL: CPT | Performed by: PSYCHIATRY & NEUROLOGY

## 2017-07-16 PROCEDURE — 80053 COMPREHEN METABOLIC PANEL: CPT | Performed by: PSYCHIATRY & NEUROLOGY

## 2017-07-16 PROCEDURE — 99232 SBSQ HOSP IP/OBS MODERATE 35: CPT | Performed by: PSYCHIATRY & NEUROLOGY

## 2017-07-16 PROCEDURE — 84443 ASSAY THYROID STIM HORMONE: CPT | Performed by: PSYCHIATRY & NEUROLOGY

## 2017-07-16 RX ADMIN — FLUOXETINE HYDROCHLORIDE 40 MG: 20 CAPSULE ORAL at 10:37

## 2017-07-16 RX ADMIN — Medication 1 TABLET: at 10:36

## 2017-07-16 RX ADMIN — QUETIAPINE FUMARATE 100 MG: 100 TABLET ORAL at 20:37

## 2017-07-16 RX ADMIN — CYANOCOBALAMIN TAB 1000 MCG 500 MCG: 1000 TAB at 10:37

## 2017-07-16 NOTE — PROGRESS NOTES
"      PROGRESS NOTE  Clinician: Benny Wade MD  Admission Date: 7/14/2017  10:41 AM 07/16/17    Behavioral Health Treatment Plan and Problem List: I have reviewed and approved the Behavioral Health Treatment Plan and Problem list.    Allergies  No Known Allergies    Hospital Day: 2 days      Assessment completed within view of staff    History  CC: inpatient followup  Interval HPI: Patient seen and evaluated by me.  He feels very slightly better this morning, but severe depressive symptoms are persisting.  +SI, but feels safe here in the hospital. \"I was disappointed when I woke up this morning.\"      Interval Review of Systems:   General ROS: negative for - fever or malaise  Endocrine ROS: negative for - palpitations  Respiratory ROS: no cough, shortness of breath, or wheezing  Cardiovascular ROS: no chest pain or dyspnea on exertion  Gastrointestinal ROS: no abdominal pain,no black or bloody stools    /78 (BP Location: Left arm, Patient Position: Lying)  Pulse 62  Temp 97.3 °F (36.3 °C) (Temporal Artery )   Resp 20  Ht 70\" (177.8 cm)  Wt 133 lb (60.3 kg)  SpO2 98%  BMI 19.08 kg/m2    Mental Status Exam  Mood/Affect: depressed  Thought Processes:  linear, logical, and goal directed  Thought Content:  normal  Suicidal Thoughts:  moderate  Suicidal Plan/Intent: none  Homicidal Thoughts:  absent        Medical Decision Making:   Labs:     Lab Results (last 24 hours)     Procedure Component Value Units Date/Time    Lipid Panel [[id number]]  (Abnormal) Collected:  07/16/17 0523    Specimen:  Blood Updated:  07/16/17 0655     Total Cholesterol 292 (H) mg/dL      Triglycerides 118 mg/dL      HDL Cholesterol 63 mg/dL      LDL Cholesterol  205 (H) mg/dL      VLDL Cholesterol 23.6 mg/dL      LDL/HDL Ratio 3.26    Narrative:       Cholesterol Reference Ranges  (U.S. Department of Health and Human Services ATP III Classifications)    Desirable          <200 mg/dL  Borderline High    200-239 mg/dL  High Risk      "     >240 mg/dL      Triglyceride Reference Ranges  (U.S. Department of Health and Human Services ATP III Classifications)    Normal           <150 mg/dL  Borderline High  150-199 mg/dL  High             200-499 mg/dL  Very High        >500 mg/dL    HDL Reference Ranges  (U.S. Department of Health and Human Services ATP III Classifcations)    Low     <40 mg/dl (major risk factor for CHD)  High    >60 mg/dl ('negative' risk factor for CHD)        LDL Reference Ranges  (U.S. Department of Health and Human Services ATP III Classifcations)    Optimal          <100 mg/dL  Near Optimal     100-129 mg/dL  Borderline High  130-159 mg/dL  High             160-189 mg/dL  Very High        >189 mg/dL    Comprehensive Metabolic Panel [[id number]]  (Abnormal) Collected:  07/16/17 0523    Specimen:  Blood Updated:  07/16/17 0658     Glucose 86 mg/dL      BUN 26 (H) mg/dL      Creatinine 1.10 mg/dL      Sodium 142 mmol/L      Potassium 4.5 mmol/L      Chloride 108 mmol/L      CO2 26.0 mmol/L      Calcium 9.9 mg/dL      Total Protein 7.6 g/dL      Albumin 4.50 g/dL      ALT (SGPT) 41 U/L      AST (SGOT) 28 U/L      Alkaline Phosphatase 76 U/L       Note New Reference Ranges        Total Bilirubin 0.3 mg/dL      eGFR Non African Amer 77 mL/min/1.73      Globulin 3.1 gm/dL      A/G Ratio 1.5 g/dL      BUN/Creatinine Ratio 23.6     Anion Gap 8.0 mmol/L     Osmolality, Calculated [[id number]]  (Normal) Collected:  07/16/17 0523    Specimen:  Blood Updated:  07/16/17 0658     Osmolality Calc 287.2 mOsm/kg             Radiology:     Imaging Results (last 24 hours)     ** No results found for the last 24 hours. **            EKG:     ECG/EMG Results (most recent)     None           Medications:     FLUoxetine 40 mg Oral Daily   multivitamin 1 tablet Oral Daily   nicotine 1 patch Transdermal Q24H   QUEtiapine 100 mg Oral Nightly   vitamin B-12 500 mcg Oral Daily          All medications reviewed.    Special Precautions: Continue current level  of Special Precautions.    Assessment/Diagnosis:   Patient Active Problem List   Diagnosis Code   • Major depressive disorder, recurrent, severe without psychotic features F33.2   • OCD (obsessive compulsive disorder) F42.9   • Polysubstance abuse F19.10   • MDD (major depressive disorder), single episode F32.9     Treatment Plan: Continue current treatment plan. Check TSH b/c it was low in March.    Attestation:   Physician Attestation: I attest that the above note accurately reflects work and decisions made by me.

## 2017-07-16 NOTE — PLAN OF CARE
Problem:  Patient Care Overview (Adult)  Goal: Plan of Care Review  Outcome: Ongoing (interventions implemented as appropriate)  Rated anxiety as 4 and depression as 7. Denies S.I. Depressed affect. Says he just feels bummed out about family problems. Out of room briefly during evening hours to make a phone call, otherwise has stayed in his room. Has slept well thus far.    07/16/17 0426   Coping/Psychosocial Response Interventions   Plan Of Care Reviewed With patient   Coping/Psychosocial   Patient Agreement with Plan of Care agrees   Patient Care Overview   Progress no change         Problem:  Overarching Goals  Goal: Adheres to Safety Considerations for Self and Others  Outcome: Ongoing (interventions implemented as appropriate)  Goal: Optimized Coping Skills in Response to Life Stressors  Outcome: Ongoing (interventions implemented as appropriate)  Goal: Develops/Participates in Therapeutic [place] to Support Successful Transition  Outcome: Ongoing (interventions implemented as appropriate)

## 2017-07-16 NOTE — PLAN OF CARE
Problem:  Patient Care Overview (Adult)  Goal: Plan of Care Review  Outcome: Ongoing (interventions implemented as appropriate)    07/16/17 2234   Coping/Psychosocial Response Interventions   Plan Of Care Reviewed With patient   Coping/Psychosocial   Patient Agreement with Plan of Care agrees   Patient Care Overview   Progress no change   Outcome Evaluation   Outcome Summary/Follow up Plan PT REPORTS ANXIETY 5/10 AND DEPRESSION 7/10. DENIES ANY SI, HI, OR A/V HALLUCINATIONS. PT IS WITHDRAWAN AND STAYS IN HER ROOM THE MAJORITY OF THE SHIFT.          Problem:  Overarching Goals  Goal: Adheres to Safety Considerations for Self and Others  Outcome: Ongoing (interventions implemented as appropriate)  Goal: Optimized Coping Skills in Response to Life Stressors  Outcome: Ongoing (interventions implemented as appropriate)  Goal: Develops/Participates in Therapeutic [place] to Support Successful Transition  Outcome: Ongoing (interventions implemented as appropriate)

## 2017-07-17 PROBLEM — F32.9 MDD (MAJOR DEPRESSIVE DISORDER), SINGLE EPISODE: Status: RESOLVED | Noted: 2017-07-14 | Resolved: 2017-07-17

## 2017-07-17 PROCEDURE — 99232 SBSQ HOSP IP/OBS MODERATE 35: CPT | Performed by: PSYCHIATRY & NEUROLOGY

## 2017-07-17 RX ORDER — FLUOXETINE HYDROCHLORIDE 20 MG/1
60 CAPSULE ORAL DAILY
Status: DISCONTINUED | OUTPATIENT
Start: 2017-07-18 | End: 2017-07-19 | Stop reason: HOSPADM

## 2017-07-17 RX ADMIN — Medication 1 TABLET: at 09:04

## 2017-07-17 RX ADMIN — CYANOCOBALAMIN TAB 1000 MCG 500 MCG: 1000 TAB at 09:04

## 2017-07-17 RX ADMIN — NICOTINE POLACRILEX 2 MG: 2 GUM, CHEWING ORAL at 20:11

## 2017-07-17 RX ADMIN — QUETIAPINE FUMARATE 100 MG: 100 TABLET ORAL at 20:32

## 2017-07-17 RX ADMIN — FLUOXETINE HYDROCHLORIDE 40 MG: 20 CAPSULE ORAL at 09:04

## 2017-07-17 NOTE — PROGRESS NOTES
"      PROGRESS NOTE  Clinician: Benny Wade MD  Admission Date: 7/14/2017  12:09 PM 07/17/17    Behavioral Health Treatment Plan and Problem List: I have reviewed and approved the Behavioral Health Treatment Plan and Problem list.    Allergies  No Known Allergies    Hospital Day: 3 days      Assessment completed within view of staff    History  CC: inpatient followup  Interval HPI: Patient seen and evaluated by me.  Chart reviewed. Feels very anxious this morning.  SI improving.  He rates his current level of depression at a 8/10.  TSH WNL. ROS negative as below.    Interval Review of Systems:   General ROS: negative for - fever or malaise  Endocrine ROS: negative for - palpitations  Respiratory ROS: no cough, shortness of breath, or wheezing  Cardiovascular ROS: no chest pain or dyspnea on exertion  Gastrointestinal ROS: no abdominal pain,no black or bloody stools    /74 (BP Location: Right arm, Patient Position: Lying)  Pulse 66  Temp 98.1 °F (36.7 °C) (Temporal Artery )   Resp 16  Ht 70\" (177.8 cm)  Wt 133 lb (60.3 kg)  SpO2 98%  BMI 19.08 kg/m2    Mental Status Exam  Mood/Affect: depressed/constricted  Thought Processes:  linear, logical, and goal directed  Thought Content:  normal  Suicidal Thoughts:  slight  Suicidal Plan/Intent: none  Homicidal Thoughts:  absent        Medical Decision Making:   Labs:     Lab Results (last 24 hours)     ** No results found for the last 24 hours. **            Radiology:     Imaging Results (last 24 hours)     ** No results found for the last 24 hours. **            EKG:     ECG/EMG Results (most recent)     None           Medications:     FLUoxetine 40 mg Oral Daily   multivitamin 1 tablet Oral Daily   nicotine 1 patch Transdermal Q24H   QUEtiapine 100 mg Oral Nightly   vitamin B-12 500 mcg Oral Daily          All medications reviewed.    Special Precautions: Continue current level of Special Precautions.    Assessment/Diagnosis:   Patient Active Problem List "   Diagnosis Code   • Major depressive disorder, recurrent, severe without psychotic features F33.2   • OCD (obsessive compulsive disorder) F42.9   • Polysubstance abuse F19.10   • MDD (major depressive disorder), single episode F32.9     Treatment Plan: Continue current treatment plan.  Increase prozac to 60mg Daily.    Attestation:   Physician Attestation: I attest that the above note accurately reflects work and decisions made by me.

## 2017-07-17 NOTE — PLAN OF CARE
Problem:  Patient Care Overview (Adult)  Goal: Plan of Care Review  Outcome: Ongoing (interventions implemented as appropriate)  Rates anxiety as 6 and depression as 7. Denies S.I. Withdrawn with depressed affect. Appeared to sleep well this shift.    07/17/17 0458   Coping/Psychosocial Response Interventions   Plan Of Care Reviewed With patient   Coping/Psychosocial   Patient Agreement with Plan of Care agrees   Patient Care Overview   Progress no change         Problem:  Overarching Goals  Goal: Adheres to Safety Considerations for Self and Others  Outcome: Ongoing (interventions implemented as appropriate)  Goal: Optimized Coping Skills in Response to Life Stressors  Outcome: Ongoing (interventions implemented as appropriate)  Goal: Develops/Participates in Therapeutic [place] to Support Successful Transition  Outcome: Ongoing (interventions implemented as appropriate)

## 2017-07-17 NOTE — PLAN OF CARE
Problem: BH Patient Care Overview (Adult)  Goal: Plan of Care Review  Outcome: Ongoing (interventions implemented as appropriate)  PATIENT VERBALIZES ANXIETY, DEPRESSION. NEW ORDERS: NICORETTE GUM, INCREASE PROZAC TO 60 Q DAY.    07/17/17 1443   Coping/Psychosocial Response Interventions   Plan Of Care Reviewed With patient   Coping/Psychosocial   Patient Agreement with Plan of Care agrees   Patient Care Overview   Progress no change       Goal: Interdisciplinary Rounds/Family Conference  Outcome: Ongoing (interventions implemented as appropriate)  Goal: Individualization and Mutuality  Outcome: Ongoing (interventions implemented as appropriate)  Goal: Discharge Needs Assessment  Outcome: Ongoing (interventions implemented as appropriate)    Problem:  Overarching Goals  Goal: Adheres to Safety Considerations for Self and Others  Outcome: Ongoing (interventions implemented as appropriate)  Goal: Optimized Coping Skills in Response to Life Stressors  Outcome: Ongoing (interventions implemented as appropriate)  Goal: Develops/Participates in Therapeutic [place] to Support Successful Transition  Outcome: Ongoing (interventions implemented as appropriate)

## 2017-07-18 PROCEDURE — 99232 SBSQ HOSP IP/OBS MODERATE 35: CPT | Performed by: PSYCHIATRY & NEUROLOGY

## 2017-07-18 RX ORDER — DOXEPIN HYDROCHLORIDE 25 MG/1
25 CAPSULE ORAL NIGHTLY
Status: DISCONTINUED | OUTPATIENT
Start: 2017-07-18 | End: 2017-07-19 | Stop reason: HOSPADM

## 2017-07-18 RX ADMIN — NICOTINE POLACRILEX 2 MG: 2 GUM, CHEWING ORAL at 12:33

## 2017-07-18 RX ADMIN — CYANOCOBALAMIN TAB 1000 MCG 500 MCG: 1000 TAB at 08:56

## 2017-07-18 RX ADMIN — FLUOXETINE HYDROCHLORIDE 60 MG: 20 CAPSULE ORAL at 08:56

## 2017-07-18 RX ADMIN — NICOTINE POLACRILEX 2 MG: 2 GUM, CHEWING ORAL at 21:10

## 2017-07-18 RX ADMIN — DOXEPIN HYDROCHLORIDE 25 MG: 25 CAPSULE ORAL at 21:10

## 2017-07-18 RX ADMIN — Medication 1 TABLET: at 08:56

## 2017-07-18 NOTE — DISCHARGE INSTR - APPOINTMENTS
[facility]  [address] [place]. [address]  [phone]  SEE NAPOLEON HURT ON July 31ST AT 10AM      [facility]  [address],[address]  [phone]    August 15th, 2017 @ 2:00pm

## 2017-07-18 NOTE — PLAN OF CARE
Problem: BH Patient Care Overview (Adult)  Goal: Plan of Care Review  Outcome: Ongoing (interventions implemented as appropriate)  PATIENT VERBALIZES ANXIETY, DEPRESSION AND POOR SLEEP. NEW ORDERS:SEROQUEL 100MG Q DAY    07/18/17 1522   Coping/Psychosocial Response Interventions   Plan Of Care Reviewed With patient   Coping/Psychosocial   Patient Agreement with Plan of Care agrees   Patient Care Overview   Progress no change       Goal: Interdisciplinary Rounds/Family Conference  Outcome: Ongoing (interventions implemented as appropriate)  Goal: Individualization and Mutuality  Outcome: Ongoing (interventions implemented as appropriate)  Goal: Discharge Needs Assessment  Outcome: Ongoing (interventions implemented as appropriate)    Problem:  Overarching Goals  Goal: Adheres to Safety Considerations for Self and Others  Outcome: Ongoing (interventions implemented as appropriate)  Goal: Optimized Coping Skills in Response to Life Stressors  Outcome: Ongoing (interventions implemented as appropriate)  Goal: Develops/Participates in Therapeutic [place] to Support Successful Transition  Outcome: Ongoing (interventions implemented as appropriate)

## 2017-07-18 NOTE — PROGRESS NOTES
Therapist met with the Patient individually, he was agreeable. He reports that he is feeling somewhat improved, however, continues to be anxious related to his aftercare. He was court ordered to long term residential and was kicked out after he was taken to the ED for anxiety and panic attacks and phoned his family to come see him, which was against the rules of his treatment facility. The patient is unable to return there now and unable to find another facility because he has had some period of sobriety and his insurance will not cover long term at this time. He is interested in IOP if the  will agree. Therapist recommended that he or his family talk with the  to ask what he is permitted to do and then appropriate referrals will be made for ongoing treatment.

## 2017-07-18 NOTE — PLAN OF CARE
Problem: BH Patient Care Overview (Adult)  Goal: Plan of Care Review  Outcome: Ongoing (interventions implemented as appropriate)    07/18/17 0522   Coping/Psychosocial Response Interventions   Plan Of Care Reviewed With patient   Patient Care Overview   Progress no change   Outcome Evaluation   Outcome Summary/Follow up Plan Patient calm and cooperative this shift. Rates anxiety a 3/10 and depression a 4/10. Denies SI/HI and hallucinations.          Problem:  Overarching Goals  Goal: Adheres to Safety Considerations for Self and Others  Outcome: Ongoing (interventions implemented as appropriate)  Goal: Optimized Coping Skills in Response to Life Stressors  Outcome: Ongoing (interventions implemented as appropriate)  Goal: Develops/Participates in Therapeutic [place] to Support Successful Transition  Outcome: Ongoing (interventions implemented as appropriate)

## 2017-07-18 NOTE — PROGRESS NOTES
"      PROGRESS NOTE  Clinician: Benny Wade MD  Admission Date: 7/14/2017  11:12 AM 07/18/17    Behavioral Health Treatment Plan and Problem List: I have reviewed and approved the Behavioral Health Treatment Plan and Problem list.    Allergies  No Known Allergies    Hospital Day: 4 days      Assessment completed within view of staff    History  CC: inpatient followup  Interval HPI: Patient seen and evaluated by me.  Chart reviewed.  \"I feel a little bit better...  A little less stressed\"  Depression improving.  SI resolving.  ROS negative as below, with the exception of feeling a little sleepy today -  Cause unclear - poor sleep vs. Side effect of medication.    Interval Review of Systems:   General ROS: negative for - fever or malaise  Endocrine ROS: negative for - palpitations  Respiratory ROS: no cough, shortness of breath, or wheezing  Cardiovascular ROS: no chest pain or dyspnea on exertion  Gastrointestinal ROS: no abdominal pain,no black or bloody stools    /82 (BP Location: Right arm, Patient Position: Lying)  Pulse 65  Temp 97.7 °F (36.5 °C) (Temporal Artery )   Resp 18  Ht 70\" (177.8 cm)  Wt 133 lb (60.3 kg)  SpO2 98%  BMI 19.08 kg/m2    Mental Status Exam  Mood/Affect: depressed  Thought Processes:  linear, logical, and goal directed  Thought Content:  normal  Suicidal Thoughts:  denies  Suicidal Plan/Intent: none  Homicidal Thoughts:  absent        Medical Decision Making:   Labs:     Lab Results (last 24 hours)     ** No results found for the last 24 hours. **            Radiology:     Imaging Results (last 24 hours)     ** No results found for the last 24 hours. **            EKG:     ECG/EMG Results (most recent)     None           Medications:     FLUoxetine 60 mg Oral Daily   multivitamin 1 tablet Oral Daily   QUEtiapine 100 mg Oral Nightly   vitamin B-12 500 mcg Oral Daily          All medications reviewed.    Special Precautions: Continue current level of Special " Precautions.    Assessment/Diagnosis:   Patient Active Problem List   Diagnosis Code   • Major depressive disorder, recurrent, severe without psychotic features F33.2   • OCD (obsessive compulsive disorder) F42.9   • Polysubstance abuse F19.10     Treatment Plan: Continue current treatment plan.  Consider lining up followup with IOP.  I have discussed his cholesterol level with him.  Will discontinue Seroquel as that could be a potential culprit.  I'm going to hold off on starting medication for high cholesterol and seroquel could be causing the elevation of LDL. Advised diet, exercise.  However, he will need to follow-up with a primary care physician after discharge to have his cholesterol rechecked.  Start doxepin 25mg QHS.    Attestation:   Physician Attestation: I attest that the above note accurately reflects work and decisions made by me.

## 2017-07-19 VITALS
HEIGHT: 70 IN | SYSTOLIC BLOOD PRESSURE: 122 MMHG | RESPIRATION RATE: 18 BRPM | DIASTOLIC BLOOD PRESSURE: 78 MMHG | OXYGEN SATURATION: 99 % | BODY MASS INDEX: 19.04 KG/M2 | HEART RATE: 63 BPM | TEMPERATURE: 98.4 F | WEIGHT: 133 LBS

## 2017-07-19 PROCEDURE — 99238 HOSP IP/OBS DSCHRG MGMT 30/<: CPT | Performed by: PSYCHIATRY & NEUROLOGY

## 2017-07-19 RX ORDER — FLUOXETINE HYDROCHLORIDE 20 MG/1
60 CAPSULE ORAL DAILY
Qty: 90 CAPSULE | Refills: 0 | Status: SHIPPED | OUTPATIENT
Start: 2017-07-19 | End: 2019-02-25

## 2017-07-19 RX ORDER — DOXEPIN HYDROCHLORIDE 25 MG/1
25 CAPSULE ORAL NIGHTLY
Qty: 30 CAPSULE | Refills: 0 | Status: SHIPPED | OUTPATIENT
Start: 2017-07-19

## 2017-07-19 RX ADMIN — NICOTINE POLACRILEX 2 MG: 2 GUM, CHEWING ORAL at 12:29

## 2017-07-19 RX ADMIN — CYANOCOBALAMIN TAB 1000 MCG 500 MCG: 1000 TAB at 08:21

## 2017-07-19 RX ADMIN — Medication 1 TABLET: at 08:22

## 2017-07-19 RX ADMIN — FLUOXETINE HYDROCHLORIDE 60 MG: 20 CAPSULE ORAL at 08:22
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
## 2017-07-19 NOTE — PLAN OF CARE
Problem: BH Patient Care Overview (Adult)  Goal: Plan of Care Review  Outcome: Ongoing (interventions implemented as appropriate)    07/19/17 0437   Coping/Psychosocial Response Interventions   Plan Of Care Reviewed With patient   Coping/Psychosocial   Patient Agreement with Plan of Care agrees   Patient Care Overview   Progress no change   Outcome Evaluation   Outcome Summary/Follow up Plan Pt calm and cooperative this shift. Rates anxiety 5/10 and depression a 2/10. Denies SI/HI.          Problem:  Overarching Goals  Goal: Adheres to Safety Considerations for Self and Others  Outcome: Ongoing (interventions implemented as appropriate)  Goal: Optimized Coping Skills in Response to Life Stressors  Outcome: Ongoing (interventions implemented as appropriate)  Goal: Develops/Participates in Therapeutic [place] to Support Successful Transition  Outcome: Ongoing (interventions implemented as appropriate)

## 2018-09-10 ENCOUNTER — LAB (OUTPATIENT)
Dept: LAB | Facility: HOSPITAL | Age: 38
End: 2018-09-10

## 2018-09-10 ENCOUNTER — TRANSCRIBE ORDERS (OUTPATIENT)
Dept: ADMINISTRATIVE | Facility: HOSPITAL | Age: 38
End: 2018-09-10

## 2018-09-10 ENCOUNTER — HOSPITAL ENCOUNTER (OUTPATIENT)
Dept: GENERAL RADIOLOGY | Facility: HOSPITAL | Age: 38
Discharge: HOME | End: 2018-09-10
Admitting: NURSE PRACTITIONER

## 2018-09-10 DIAGNOSIS — M79.641 PAIN OF RIGHT HAND: Primary | ICD-10-CM

## 2018-09-10 DIAGNOSIS — I10 ESSENTIAL HYPERTENSION, BENIGN: ICD-10-CM

## 2018-09-10 DIAGNOSIS — R53.82 CHRONIC FATIGUE: ICD-10-CM

## 2018-09-10 DIAGNOSIS — M79.641 PAIN OF RIGHT HAND: ICD-10-CM

## 2018-09-10 LAB
ALBUMIN SERPL-MCNC: 4.4 G/DL (ref 3.5–5)
ALBUMIN/GLOB SERPL: 1.3 G/DL (ref 1.5–2.5)
ALP SERPL-CCNC: 80 U/L (ref 40–129)
ALT SERPL W P-5'-P-CCNC: 44 U/L (ref 10–44)
AMYLASE SERPL-CCNC: 100 U/L (ref 28–100)
ANION GAP SERPL CALCULATED.3IONS-SCNC: 7.1 MMOL/L (ref 3.6–11.2)
AST SERPL-CCNC: 26 U/L (ref 10–34)
BILIRUB SERPL-MCNC: 0.5 MG/DL (ref 0.2–1.8)
BUN BLD-MCNC: 19 MG/DL (ref 7–21)
BUN/CREAT SERPL: 14.2 (ref 7–25)
CALCIUM SPEC-SCNC: 9.2 MG/DL (ref 7.7–10)
CHLORIDE SERPL-SCNC: 108 MMOL/L (ref 99–112)
CHOLEST SERPL-MCNC: 332 MG/DL (ref 0–200)
CHROMATIN AB SERPL-ACNC: 9 IU/ML (ref 0–14)
CO2 SERPL-SCNC: 22.9 MMOL/L (ref 24.3–31.9)
CREAT BLD-MCNC: 1.34 MG/DL (ref 0.43–1.29)
ERYTHROCYTE [SEDIMENTATION RATE] IN BLOOD: 6 MM/HR (ref 0–15)
GFR SERPL CREATININE-BSD FRML MDRD: 61 ML/MIN/1.73
GLOBULIN UR ELPH-MCNC: 3.3 GM/DL
GLUCOSE BLD-MCNC: 110 MG/DL (ref 70–110)
HAV IGM SERPL QL IA: NORMAL
HBV CORE IGM SERPL QL IA: NORMAL
HBV SURFACE AG SERPL QL IA: NORMAL
HCV AB SER DONR QL: NORMAL
HDLC SERPL-MCNC: 63 MG/DL (ref 60–100)
LDLC SERPL CALC-MCNC: 229 MG/DL (ref 0–100)
LDLC/HDLC SERPL: 3.63 {RATIO}
OSMOLALITY SERPL CALC.SUM OF ELEC: 278.6 MOSM/KG (ref 273–305)
POTASSIUM BLD-SCNC: 4.5 MMOL/L (ref 3.5–5.3)
PROT SERPL-MCNC: 7.7 G/DL (ref 6–8)
SODIUM BLD-SCNC: 138 MMOL/L (ref 135–153)
TRIGL SERPL-MCNC: 200 MG/DL (ref 0–150)
TSH SERPL DL<=0.05 MIU/L-ACNC: 0.51 MIU/ML (ref 0.55–4.78)
URATE SERPL-MCNC: 9.2 MG/DL (ref 3.7–7)
VLDLC SERPL-MCNC: 40 MG/DL

## 2018-09-10 PROCEDURE — 73130 X-RAY EXAM OF HAND: CPT

## 2018-09-10 PROCEDURE — 86038 ANTINUCLEAR ANTIBODIES: CPT

## 2018-09-10 PROCEDURE — 84443 ASSAY THYROID STIM HORMONE: CPT

## 2018-09-10 PROCEDURE — 80074 ACUTE HEPATITIS PANEL: CPT

## 2018-09-10 PROCEDURE — 36415 COLL VENOUS BLD VENIPUNCTURE: CPT

## 2018-09-10 PROCEDURE — 82150 ASSAY OF AMYLASE: CPT

## 2018-09-10 PROCEDURE — 84550 ASSAY OF BLOOD/URIC ACID: CPT

## 2018-09-10 PROCEDURE — 86431 RHEUMATOID FACTOR QUANT: CPT

## 2018-09-10 PROCEDURE — 80061 LIPID PANEL: CPT

## 2018-09-10 PROCEDURE — 80053 COMPREHEN METABOLIC PANEL: CPT

## 2018-09-10 PROCEDURE — 85652 RBC SED RATE AUTOMATED: CPT

## 2018-09-10 PROCEDURE — 73130 X-RAY EXAM OF HAND: CPT | Performed by: RADIOLOGY

## 2018-09-11 LAB — ANA SER QL: NEGATIVE

## 2018-11-06 ENCOUNTER — TRANSCRIBE ORDERS (OUTPATIENT)
Dept: ADMINISTRATIVE | Facility: HOSPITAL | Age: 38
End: 2018-11-06

## 2018-11-06 ENCOUNTER — HOSPITAL ENCOUNTER (OUTPATIENT)
Dept: GENERAL RADIOLOGY | Facility: HOSPITAL | Age: 38
Discharge: HOME | End: 2018-11-06

## 2018-11-06 ENCOUNTER — HOSPITAL ENCOUNTER (OUTPATIENT)
Dept: GENERAL RADIOLOGY | Facility: HOSPITAL | Age: 38
Discharge: HOME | End: 2018-11-06
Admitting: NURSE PRACTITIONER

## 2018-11-06 DIAGNOSIS — M54.9 BACK PAIN, UNSPECIFIED BACK LOCATION, UNSPECIFIED BACK PAIN LATERALITY, UNSPECIFIED CHRONICITY: ICD-10-CM

## 2018-11-06 DIAGNOSIS — M54.2 NECK PAIN: ICD-10-CM

## 2018-11-06 DIAGNOSIS — M54.2 NECK PAIN: Primary | ICD-10-CM

## 2018-11-06 DIAGNOSIS — R05.9 COUGH: ICD-10-CM

## 2018-11-06 PROCEDURE — 72050 X-RAY EXAM NECK SPINE 4/5VWS: CPT

## 2018-11-06 PROCEDURE — 72074 X-RAY EXAM THORAC SPINE4/>VW: CPT

## 2018-11-06 PROCEDURE — 71046 X-RAY EXAM CHEST 2 VIEWS: CPT | Performed by: RADIOLOGY

## 2018-11-06 PROCEDURE — 72050 X-RAY EXAM NECK SPINE 4/5VWS: CPT | Performed by: RADIOLOGY

## 2018-11-06 PROCEDURE — 72110 X-RAY EXAM L-2 SPINE 4/>VWS: CPT

## 2018-11-06 PROCEDURE — 72110 X-RAY EXAM L-2 SPINE 4/>VWS: CPT | Performed by: RADIOLOGY

## 2018-11-06 PROCEDURE — 71046 X-RAY EXAM CHEST 2 VIEWS: CPT

## 2018-11-06 PROCEDURE — 72072 X-RAY EXAM THORAC SPINE 3VWS: CPT | Performed by: RADIOLOGY

## 2019-02-25 ENCOUNTER — OFFICE VISIT (OUTPATIENT)
Dept: RETAIL CLINIC | Facility: CLINIC | Age: 39
End: 2019-02-25

## 2019-02-25 VITALS
HEART RATE: 86 BPM | WEIGHT: 194.2 LBS | BODY MASS INDEX: 27.86 KG/M2 | SYSTOLIC BLOOD PRESSURE: 128 MMHG | DIASTOLIC BLOOD PRESSURE: 88 MMHG | OXYGEN SATURATION: 96 % | TEMPERATURE: 100.6 F | RESPIRATION RATE: 18 BRPM

## 2019-02-25 DIAGNOSIS — J10.1 INFLUENZA A: ICD-10-CM

## 2019-02-25 DIAGNOSIS — R68.89 FLU-LIKE SYMPTOMS: Primary | ICD-10-CM

## 2019-02-25 LAB
EXPIRATION DATE: ABNORMAL
FLUAV AG NPH QL: POSITIVE
FLUBV AG NPH QL: NEGATIVE
INTERNAL CONTROL: ABNORMAL
Lab: ABNORMAL

## 2019-02-25 PROCEDURE — 87804 INFLUENZA ASSAY W/OPTIC: CPT | Performed by: NURSE PRACTITIONER

## 2019-02-25 PROCEDURE — 99203 OFFICE O/P NEW LOW 30 MIN: CPT | Performed by: NURSE PRACTITIONER

## 2019-02-25 RX ORDER — ALBUTEROL SULFATE 90 UG/1
2 AEROSOL, METERED RESPIRATORY (INHALATION) EVERY 4 HOURS PRN
Qty: 1 INHALER | Refills: 0 | Status: SHIPPED | OUTPATIENT
Start: 2019-02-25 | End: 2019-03-27

## 2019-02-25 RX ORDER — CLONIDINE HYDROCHLORIDE 0.1 MG/1
0.1 TABLET ORAL 3 TIMES DAILY
Refills: 0 | COMMUNITY
Start: 2019-02-18 | End: 2020-05-28

## 2019-02-25 RX ORDER — OSELTAMIVIR PHOSPHATE 75 MG/1
75 CAPSULE ORAL 2 TIMES DAILY
Qty: 10 CAPSULE | Refills: 0 | Status: SHIPPED | OUTPATIENT
Start: 2019-02-25 | End: 2019-03-02

## 2019-02-25 RX ORDER — VENLAFAXINE HYDROCHLORIDE 75 MG/1
75 CAPSULE, EXTENDED RELEASE ORAL DAILY
Refills: 0 | COMMUNITY
Start: 2019-02-18 | End: 2020-05-28

## 2019-02-25 RX ORDER — HYDROCHLOROTHIAZIDE 25 MG/1
25 TABLET ORAL DAILY
Refills: 0 | COMMUNITY
Start: 2019-01-21

## 2019-02-25 RX ORDER — GABAPENTIN 800 MG/1
800 TABLET ORAL 3 TIMES DAILY
Refills: 0 | COMMUNITY
Start: 2019-02-18

## 2019-02-25 RX ORDER — PRAZOSIN HYDROCHLORIDE 1 MG/1
1 CAPSULE ORAL DAILY
Refills: 0 | COMMUNITY
Start: 2019-02-18

## 2019-02-25 RX ORDER — TRIFLUOPERAZINE HYDROCHLORIDE 1 MG/1
1 TABLET, FILM COATED ORAL EVERY 12 HOURS
Refills: 0 | COMMUNITY
Start: 2019-02-18

## 2019-02-25 RX ORDER — PROPRANOLOL HYDROCHLORIDE 10 MG/1
10 TABLET ORAL EVERY 12 HOURS
Refills: 0 | COMMUNITY
Start: 2019-02-18

## 2019-02-25 NOTE — PROGRESS NOTES
Benny presents to the clinic today c/o upper respiratory infection/Flu like symptoms which started last night and has rapidly worsened today. Associated symptoms include fever/chills, congestion, cough, fatigue and body aches He has tried no medications or home remedies. He has a  baby who is expected to come home today. He has his 10 y/o daughter here with similar symptoms.     Flu Symptoms   This is a new problem. The current episode started today. The problem has been rapidly worsening. Associated symptoms include anorexia, chills, congestion, coughing, fatigue, a fever, headaches and myalgias. Pertinent negatives include no nausea, rash, sore throat or vomiting. He has tried nothing for the symptoms.      Refer to ROS for additional information.  Vitals:    19 1556   BP: 128/88   Pulse: 86   Resp: 18   Temp: (!) 100.6 °F (38.1 °C)   SpO2: 96%     The following portions of the patient's history were reviewed and updated as appropriate: allergies, current medications, past family history, past medical history, past social history, past surgical history and problem list.    Review of Systems   Constitutional: Positive for activity change, appetite change, chills, fatigue and fever.   HENT: Positive for congestion and rhinorrhea. Negative for sinus pressure, sinus pain, sore throat and tinnitus.    Eyes: Negative for discharge and redness.   Respiratory: Positive for cough and wheezing. Negative for shortness of breath.    Gastrointestinal: Positive for anorexia. Negative for nausea and vomiting.   Musculoskeletal: Positive for myalgias.   Skin: Negative for color change and rash.   Neurological: Positive for headaches. Negative for dizziness and light-headedness.   Hematological: Negative for adenopathy.   Psychiatric/Behavioral: Positive for sleep disturbance.     Physical Exam   Constitutional: He is oriented to person, place, and time. He appears well-developed and well-nourished. No distress.    HENT:   Head: Normocephalic.   Right Ear: Tympanic membrane and ear canal normal.   Left Ear: Tympanic membrane and ear canal normal.   Nose: Mucosal edema and rhinorrhea present. Right sinus exhibits no maxillary sinus tenderness and no frontal sinus tenderness. Left sinus exhibits no maxillary sinus tenderness and no frontal sinus tenderness.   Mouth/Throat: Oropharynx is clear and moist.   Eyes: Conjunctivae are normal. Pupils are equal, round, and reactive to light. Right eye exhibits no discharge. Left eye exhibits no discharge. No scleral icterus.   Neck: Neck supple.   Cardiovascular: Normal rate, regular rhythm and normal heart sounds. Exam reveals no friction rub.   No murmur heard.  Pulmonary/Chest: Effort normal and breath sounds normal. No respiratory distress. He has no wheezes. He has no rales.   Lymphadenopathy:     He has no cervical adenopathy.   Neurological: He is alert and oriented to person, place, and time.   Skin: Skin is warm and dry. Capillary refill takes less than 2 seconds. No rash noted. No erythema.   Psychiatric: He has a normal mood and affect. His behavior is normal.   Vitals reviewed.    Assessment/Plan   Problems Addressed this Visit     None      Visit Diagnoses     Flu-like symptoms    -  Primary    Relevant Orders    POC Influenza A / B (Completed)    Influenza A        Relevant Medications    oseltamivir (TAMIFLU) 75 MG capsule        Findings and recommendations discussed with Benny. Reviewed results of his Influenza A&B tests which positive for Flu A. Treatment options reviewed. Counseled regarding supportive care measures and smoking cessation. Encouraged him to seek further medical evaluation if symptoms worsen or do not improve within 48-72 hours.    Lab Results   Component Value Date    RAPFLUA Positive (A) 02/25/2019    RAPFLUB Negative 02/25/2019

## 2019-06-13 ENCOUNTER — OFFICE VISIT (OUTPATIENT)
Dept: ORTHOPEDIC SURGERY | Facility: CLINIC | Age: 39
End: 2019-06-13

## 2019-06-13 VITALS
HEART RATE: 85 BPM | DIASTOLIC BLOOD PRESSURE: 85 MMHG | BODY MASS INDEX: 26.2 KG/M2 | SYSTOLIC BLOOD PRESSURE: 122 MMHG | WEIGHT: 183 LBS | HEIGHT: 70 IN

## 2019-06-13 DIAGNOSIS — S62.309A CLOSED FRACTURE OF HEAD OF METACARPAL BONE: Primary | ICD-10-CM

## 2019-06-13 PROCEDURE — 26600 TREAT METACARPAL FRACTURE: CPT | Performed by: ORTHOPAEDIC SURGERY

## 2019-06-13 RX ORDER — LORATADINE 10 MG/1
10 TABLET ORAL DAILY
Refills: 0 | COMMUNITY
Start: 2019-05-15

## 2019-06-13 RX ORDER — RANITIDINE 300 MG/1
300 TABLET ORAL 2 TIMES DAILY
Refills: 0 | COMMUNITY
Start: 2019-05-15 | End: 2020-05-28

## 2019-06-13 NOTE — PROGRESS NOTES
"Patient: Benny Michael    YOB: 1980    Chief Complaint   Patient presents with   • Right Hand - Pain, Edema         History of Present Illness: 38-year-old white male who presents for evaluation of his right hand.  Apparently 11-12 days ago he struck a door.  Since then he is having pain and swelling and some difficulty moving his middle finger.  No specific paresthesias.  Had x-rays done elsewhere was shown to have a fracture and presents here for definitive care    Past Medical History:   Diagnosis Date   • ADHD (attention deficit hyperactivity disorder)    • Anxiety    • Depression    • Kidney stones    • Psychiatric illness    • Substance abuse (CMS/[facility])    • Suicidal thoughts    • Suicide attempt (CMS/[facility])     overdose attempt 4/20/17 and 4/22/17        Social History     Socioeconomic History   • Marital status:      Spouse name: Not on file   • Number of children: Not on file   • Years of education: Not on file   • Highest education level: Not on file   Occupational History   • Occupation: Student   Tobacco Use   • Smoking status: Current Every Day Smoker     Packs/day: 0.50     Years: 13.00     Pack years: 6.50     Types: Electronic Cigarette   • Smokeless tobacco: Never Used   Substance and Sexual Activity   • Alcohol use: No   • Drug use: Yes     Types: Marijuana, Amphetamines     Comment: States \"Up until last week, the extent of my drug use was marijuana. I couldn't get anybody to give me anything to help with my anxiety. Then I took everything I could get my hands on so I wouldn't wake up.\"   • Sexual activity: Yes     Partners: Female           Physical Exam: 38 y.o. male  General Appearance:    Alert and oriented x 3, cooperative, in no acute distress                   Vitals:    06/13/19 1457   BP: 122/85   BP Location: Left arm   Patient Position: Sitting   Cuff Size: Adult   Pulse: 85   Weight: 83 kg (183 lb)   Height: 177.8 cm (70\")            Exam of his right hand shows is " grossly neurovascular intact he has some mild to moderate swelling around his third metacarpal head.  He has a slight extension lag with the third finger.  He is grossly neurovascular intact no rotational abnormality noted    Radiology:     X-ray shows he has a impacted little bit of comminuted third metacarpal head fracture with minimal angulation        Assessment/Plan: About 12 days status post a acute third metacarpal head fracture.  It is somewhat shortened but is minimally angulated decision today was made to put him in a well molded short hand cast synthetic.  We will see him back in approximately 2 weeks.  We discussed the fact that his third metacarpal head may be a little bit short and he may have a slight lack of extension of the third finger but I think functionally he should be okay.  We will see him back in 2 weeks for x-rays out of cast.  I did give him a prescription for ibuprofen 800 mg every 8 hours              Patient's Body mass index is 26.26 kg/m². BMI is within normal parameters. No follow-up required..      Discussion/Summary:                This chart was completed utilizing the dragon speech recognition software.  Grammatical errors, random word insertions, pronoun errors, and incomplete sentences or occasional consequences of the system due to software limitations, ambient noise, and hardware issues.  Any questions or concerns about the content, text, or information contained within the body of this dictation should be directly addressed to the physician for clarification        This document was signed by Hola Hoffmann M.D. June 13, 2019 3:41 PM

## 2019-06-26 DIAGNOSIS — S62.309A CLOSED FRACTURE OF HEAD OF METACARPAL BONE: Primary | ICD-10-CM

## 2019-06-27 ENCOUNTER — APPOINTMENT (OUTPATIENT)
Dept: GENERAL RADIOLOGY | Facility: HOSPITAL | Age: 39
End: 2019-06-27

## 2020-01-02 ENCOUNTER — PROCEDURE VISIT (OUTPATIENT)
Dept: NEUROLOGY | Facility: CLINIC | Age: 40
End: 2020-01-02

## 2020-01-02 VITALS
SYSTOLIC BLOOD PRESSURE: 132 MMHG | HEART RATE: 105 BPM | HEIGHT: 70 IN | OXYGEN SATURATION: 98 % | BODY MASS INDEX: 23.62 KG/M2 | WEIGHT: 165 LBS | DIASTOLIC BLOOD PRESSURE: 86 MMHG

## 2020-01-02 DIAGNOSIS — G62.9 POLYNEUROPATHY: Primary | ICD-10-CM

## 2020-01-02 PROCEDURE — 95886 MUSC TEST DONE W/N TEST COMP: CPT | Performed by: PSYCHIATRY & NEUROLOGY

## 2020-01-02 PROCEDURE — 95910 NRV CNDJ TEST 7-8 STUDIES: CPT | Performed by: PSYCHIATRY & NEUROLOGY

## 2020-01-02 NOTE — PROGRESS NOTES
[facility] [place]   Electrodiagnostic Laboratory    Nerve Conduction & EMG Report        Patient:  Benny Michael   Patient ID: [id number]   YOB: 1980  Sex:  male      Exam Physician: German Lee MD  Refer Physician: Augustine Alfaro MD    Electromyogram and Nerve Conduction Velocity Procedure Note    Hx: 39 y.o. right handed male with complaint of numbness involving the both lower extremities. Symptoms have been present for several months and were provoked by no clear event. Significant past medical history includes lumbar radiculopathy. Medications include GBP. Family history no family history of nerve or muscle disease.    Exam: Motor power is normal. There is no atrophy. There are no fasciculations. Deep tendon reflexes are present and symmetrical. Sensory exam is normal.      Edx studies of the  were performed to evaluate for     NCS Examination   For sensory nerve conduction studies, the amplitude is measured peak-to-peak, the latency reported is the distal peak latency, and the conduction velocity, if measured, is determined from onset latencies and is over the forearm.   For motor nerve conduction studies, the amplitude is measured baseline-to-peak, the latency reported is the distal onset latency, the conduction velocity is calculated over the forearm, and the F wave latency is the minimum latency.   Unless otherwise noted, the hand temperature was monitored continuously and remained between 32°C and 36°C during the performance of the NCSs.        Sensory NCS      Nerve / Sites Rec. Site Onset Lat Peak Lat NP Amp PP Amp Segments Distance Velocity     ms ms µV µV  cm m/s   L SURAL - Lat Mall Antidr      Calf Lat Mall NR NR NR NR Calf - Lat Mall 14 NR      Ref.   4.20 5.0  Ref.     R SURAL - Lat Mall Antidr      Calf Lat Mall NR NR NR NR Calf - Lat Mall 14 NR      Ref.   4.20 5.0  Ref.               Motor NCS      Nerve / Sites Rec. Site Lat Amp Seq Amp Segments Dist  Velocity     ms mV %  cm m/s   L COMM PERONEAL - EDB      Ankle EDB 6.85 1.1 100 Ankle - EDB 8       Ref.  6.00 2.0  Ref.        Fib Head EDB 15.80 0.9 85.6 Fib Head - Ankle 32 35.8      Ref.     Ref.  39.0      Knee EDB 17.95 0.9 102 Knee - Fib Head 10 46.5   R COMM PERONEAL - EDB      Ankle EDB 7.30 1.9 100 Ankle - EDB 8       Ref.  6.00 2.0  Ref.        Fib Head EDB 14.60 2.2 117 Fib Head - Ankle 32 43.8      Ref.     Ref.  39.0      Knee EDB 16.50 3.1 141 Knee - Fib Head 9 47.4   L TIBIAL (KNEE) - AH      Ankle AH 5.70 0.2 100 Ankle - AH 8       Ref.  6.00 2.5  Ref.        Knee AH 15.65 0.2 144 Knee - Ankle 43 43.2      Ref.     Ref.  39.0   R TIBIAL (KNEE) - AH      Ankle AH 4.70 3.4 100 Ankle - AH 8       Ref.  6.00 2.5  Ref.        Knee AH 12.35 3.6 104 Knee - Ankle 43 56.2      Ref.     Ref.  39.0                   F  Wave      Nerve Fmin    ms   L COMM PERONEAL 55.65   REF 56.00   L TIBIAL (KNEE) 55.70   REF 56.00   R TIBIAL (KNEE) 48.30   REF 56.00   R COMM PERONEAL 55.35   REF 56.00                 H Reflex      Nerve H Lat    ms   L TIBIAL (KNEE) - [place] (S1) 34.95   Ref 36.00   R TIBIAL (KNEE) - [place] (S1) 27.10   Ref 36.00             EMG Examination   The study was performed with a concentric needle electrode. Fibrillation and fasciculation activity is graded from none (0) to continuous (4+). The configuration and recruitment pattern of motor unit action potentials under voluntary control, if not normal, are described bel      EMG Summary Table     Spontaneous MUAP Recruitment    IA Fib PSW Fasc H.F. Amp Dur. PPP Pattern   L. ILIOPSOAS N None None None None N N N N   R. ILIOPSOAS N None None None None N N N N   L. GLUTEUS MAX N None None None None N N N N   R. GLUTEUS MAX N None None None None N N N N   L. BIC FEM (S HEAD) N None None None None N N N N   R. BIC FEM (S HEAD) N None None None None N N N N   L. QUADRICEPS N None None None None N N N N   R. QUADRICEPS N None None None None N N N N   L.  GASTROCN (LAT) N None None None None N N N N   R. GASTROCN (LAT) N None None None None N N N N   L. TIB ANTERIOR N None None None None N N N N   R. TIB ANTERIOR N None None None None N N N N       · Left common peroneal motor responses revealed prolonged distal latency, decreased amplitudes, decreased conduction velocities from knee to ankle and F wave were normal  · Left tibial motor responses revealed decreased amplitudes and F wave were normal  · Left sural sensory responses were absent  · Left H-reflex responses were normal  · Right common peroneal motor responses revealed prolonged distal latency, decreased amplitudes and F wave were normal  · Right tibial motor responses and F wave were normal  · Right sural sensory responses were absent  · Right H-reflex responses were normal  · Needle examination with a concentric needle electrode of selected muscles of the bilateral lower extremities were normal     Conclusion:      1.  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         Instrument used:  T[employer] Synergy        Performed by:          German Lee MD

## 2020-05-28 ENCOUNTER — TELEMEDICINE (OUTPATIENT)
Dept: NEUROLOGY | Facility: CLINIC | Age: 40
End: 2020-05-28

## 2020-05-28 DIAGNOSIS — G62.9 POLYNEUROPATHY: Primary | ICD-10-CM

## 2020-05-28 PROCEDURE — 99212 OFFICE O/P EST SF 10 MIN: CPT | Performed by: PSYCHIATRY & NEUROLOGY

## 2020-05-28 RX ORDER — ASPIRIN 81 MG/1
TABLET, COATED ORAL
COMMUNITY
Start: 2020-05-22

## 2020-05-28 RX ORDER — LOSARTAN POTASSIUM 50 MG/1
TABLET ORAL
COMMUNITY
Start: 2020-05-22

## 2020-05-28 RX ORDER — CHLORAL HYDRATE 500 MG
CAPSULE ORAL
COMMUNITY
Start: 2020-05-22

## 2020-05-28 RX ORDER — RIVAROXABAN 20 MG/1
TABLET, FILM COATED ORAL
COMMUNITY
Start: 2020-05-22

## 2020-05-28 RX ORDER — OXYCODONE AND ACETAMINOPHEN 10; 325 MG/1; MG/1
TABLET ORAL
COMMUNITY
Start: 2020-05-19

## 2020-05-28 RX ORDER — CYCLOBENZAPRINE HCL 10 MG
TABLET ORAL
COMMUNITY

## 2020-05-28 RX ORDER — GUAIFENESIN 600 MG/1
TABLET, EXTENDED RELEASE ORAL
COMMUNITY

## 2020-05-28 RX ORDER — FAMOTIDINE 40 MG/1
TABLET, FILM COATED ORAL
COMMUNITY
Start: 2020-05-22

## 2020-05-28 NOTE — ASSESSMENT & PLAN NOTE
Secondary to LE ischemia from PVD    Continue current management of GBP and Percocet per pain management.

## 2020-05-28 NOTE — PROGRESS NOTES
"Subjective   Patient ID: Benny Michael is a 39 y.o. male     You have chosen to receive care through a telehealth visit.  Do you consent to use a video/audio connection for your medical care today? Yes    Time:  10  minutes       Chief Complaint   Patient presents with   • Polyneuropathy     Follow up         History of Present Illness    39 y.o. male seen in follow up for peripheral neuropathy.  Last visit on 1/2/20 performed EMG/NCS.    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     Burning in left foot has burning and numbness.  PAD in [place] LE with angioplasty.       Past Medical History:   Diagnosis Date   • ADHD (attention deficit hyperactivity disorder)    • Anxiety    • Depression    • Kidney stones    • Psychiatric illness    • Substance abuse (CMS/[facility])    • Suicidal thoughts    • Suicide attempt (CMS/[facility])     overdose attempt 4/20/17 and 4/22/17     Family History   Problem Relation Age of Onset   • Anxiety disorder Mother    • Depression Father    • Anxiety disorder Father    • Drug abuse Cousin      Social History     Socioeconomic History   • Marital status:      Spouse name: Not on file   • Number of children: Not on file   • Years of education: Not on file   • Highest education level: Not on file   Occupational History   • Occupation: Student   Tobacco Use   • Smoking status: Current Every Day Smoker     Packs/day: 0.50     Years: 13.00     Pack years: 6.50     Types: Electronic Cigarette   • Smokeless tobacco: Never Used   Substance and Sexual Activity   • Alcohol use: No   • Drug use: Yes     Types: Marijuana, Amphetamines     Comment: States \"Up until last week, the extent of my drug use was marijuana. I couldn't get anybody to give me anything to help with my anxiety. Then I took everything I could get my hands on so I wouldn't wake up.\"   • Sexual activity: Yes     Partners: Female "       Review of Systems   Constitutional: Negative for activity change, fatigue and unexpected weight change.   HENT: Negative for facial swelling, hearing loss, tinnitus, trouble swallowing and voice change.    Eyes: Negative for photophobia, pain and visual disturbance.   Respiratory: Negative for apnea, cough and choking.    Cardiovascular: Negative for chest pain.   Gastrointestinal: Negative for constipation, nausea and vomiting.   Endocrine: Negative for cold intolerance.   Genitourinary: Negative for difficulty urinating, frequency and urgency.   Musculoskeletal: Positive for gait problem and neck pain. Negative for arthralgias, back pain, myalgias and neck stiffness.   Skin: Negative for rash.   Allergic/Immunologic: Negative for immunocompromised state.   Neurological: Positive for weakness. Negative for dizziness, tremors, seizures, syncope, facial asymmetry, speech difficulty, light-headedness, numbness and headaches.   Hematological: Negative for adenopathy.   Psychiatric/Behavioral: Negative for confusion, decreased concentration, hallucinations and sleep disturbance. The patient is not nervous/anxious.        Objective        Neurologic Exam     Mental Status   Oriented to person, place, and time.   Attention: normal. Concentration: normal.   Speech: speech is normal   Level of consciousness: alert  Knowledge: good.   Normal comprehension.     Cranial Nerves     CN III, IV, VI   Extraocular motions are normal.     CN VII   Facial expression full, symmetric.     CN VIII   CN VIII normal.     Motor Exam MAEW       Physical Exam   Constitutional: He is oriented to person, place, and time.   Eyes: EOM are normal.   Neurological: He is oriented to person, place, and time.   Psychiatric: His speech is normal.       Office Visit on 02/25/2019   Component Date Value Ref Range Status   • Rapid Influenza A Ag 02/25/2019 Positive* Negative Final   • Rapid Influenza B Ag 02/25/2019 Negative  Negative Final   •  Internal Control 02/25/2019 Passed  Passed Final   • Lot Number 02/25/2019 [id number]   Final   • Expiration Date 02/25/2019 09/30/20   Final         Assessment/Plan     Problem List Items Addressed This Visit        Nervous and Auditory    Polyneuropathy - Primary    Current Assessment & Plan     Secondary to LE ischemia from PVD    Continue current management of GBP and Percocet per pain management.                    No follow-ups on file.